# Patient Record
Sex: MALE | Race: WHITE | Employment: OTHER | URBAN - NONMETROPOLITAN AREA
[De-identification: names, ages, dates, MRNs, and addresses within clinical notes are randomized per-mention and may not be internally consistent; named-entity substitution may affect disease eponyms.]

---

## 2019-06-14 ENCOUNTER — OFFICE VISIT (OUTPATIENT)
Dept: PULMONOLOGY | Age: 64
End: 2019-06-14
Payer: COMMERCIAL

## 2019-06-14 VITALS
WEIGHT: 218 LBS | SYSTOLIC BLOOD PRESSURE: 118 MMHG | DIASTOLIC BLOOD PRESSURE: 82 MMHG | OXYGEN SATURATION: 92 % | HEART RATE: 92 BPM | HEIGHT: 63 IN | TEMPERATURE: 97.2 F | BODY MASS INDEX: 38.62 KG/M2

## 2019-06-14 DIAGNOSIS — J44.9 CHRONIC OBSTRUCTIVE PULMONARY DISEASE, UNSPECIFIED COPD TYPE (HCC): ICD-10-CM

## 2019-06-14 DIAGNOSIS — Z87.891 PERSONAL HISTORY OF SMOKING: ICD-10-CM

## 2019-06-14 DIAGNOSIS — E66.9 OBESITY (BMI 30-39.9): ICD-10-CM

## 2019-06-14 DIAGNOSIS — J96.11 CHRONIC RESPIRATORY FAILURE WITH HYPOXIA (HCC): ICD-10-CM

## 2019-06-14 DIAGNOSIS — J98.4 CAVITARY LESION OF LUNG: ICD-10-CM

## 2019-06-14 DIAGNOSIS — G47.10 HYPERSOMNIA: Primary | ICD-10-CM

## 2019-06-14 DIAGNOSIS — R06.89 SLEEP RELATED CHOKING SENSATION: ICD-10-CM

## 2019-06-14 DIAGNOSIS — R06.83 SNORING: ICD-10-CM

## 2019-06-14 PROCEDURE — G8926 SPIRO NO PERF OR DOC: HCPCS | Performed by: INTERNAL MEDICINE

## 2019-06-14 PROCEDURE — 1036F TOBACCO NON-USER: CPT | Performed by: INTERNAL MEDICINE

## 2019-06-14 PROCEDURE — 94618 PULMONARY STRESS TESTING: CPT | Performed by: INTERNAL MEDICINE

## 2019-06-14 PROCEDURE — G8427 DOCREV CUR MEDS BY ELIG CLIN: HCPCS | Performed by: INTERNAL MEDICINE

## 2019-06-14 PROCEDURE — 3023F SPIROM DOC REV: CPT | Performed by: INTERNAL MEDICINE

## 2019-06-14 PROCEDURE — 3017F COLORECTAL CA SCREEN DOC REV: CPT | Performed by: INTERNAL MEDICINE

## 2019-06-14 PROCEDURE — G8417 CALC BMI ABV UP PARAM F/U: HCPCS | Performed by: INTERNAL MEDICINE

## 2019-06-14 PROCEDURE — 99205 OFFICE O/P NEW HI 60 MIN: CPT | Performed by: INTERNAL MEDICINE

## 2019-06-14 RX ORDER — DILTIAZEM HYDROCHLORIDE 180 MG/1
180 CAPSULE, EXTENDED RELEASE ORAL DAILY
COMMUNITY

## 2019-06-14 RX ORDER — LOSARTAN POTASSIUM 25 MG/1
25 TABLET ORAL DAILY
COMMUNITY
End: 2022-09-22

## 2019-06-14 RX ORDER — FUROSEMIDE 20 MG/1
20 TABLET ORAL 2 TIMES DAILY
COMMUNITY

## 2019-06-14 RX ORDER — BUDESONIDE 0.5 MG/2ML
1 INHALANT ORAL 2 TIMES DAILY
COMMUNITY

## 2019-06-14 RX ORDER — ACETAMINOPHEN 80 MG/1
80 TABLET, CHEWABLE ORAL EVERY 4 HOURS PRN
COMMUNITY

## 2019-06-14 ASSESSMENT — ENCOUNTER SYMPTOMS
ABDOMINAL DISTENTION: 1
WHEEZING: 0
ABDOMINAL PAIN: 1
COUGH: 1
BACK PAIN: 0
TROUBLE SWALLOWING: 0
CHOKING: 1
EYES NEGATIVE: 1
VOICE CHANGE: 0
CHEST TIGHTNESS: 1
APNEA: 0
SHORTNESS OF BREATH: 1

## 2019-06-14 NOTE — LETTER
[address] [facility]  [address]  [address] [facility],[address]  [place]  Phone: [phone]  Fax: [phone]    Mark Hampton MD        June 14, 2019     Patient: Kathy Mckenzie   YOB: 1955   Date of Visit: 6/14/2019       To Whom it May Concern:    Jaime Zabala accompanied her  to his appointment on 06/14/2019.          Sincerely,   [facility]

## 2019-06-14 NOTE — PROGRESS NOTES
Subjective:      Patient ID: Earnestine Clemens is a 59 y.o. male. CC: COPD    HPI  Referred by Dr Indy Oviedo for COPD. Has been followed by Dr Courtney Dao in the past--does not wish to see. Smoked 1 1/2 ppd since age 14 to 62 (63 PY) on supplemental oxygen 2 lpm with activities  On Budesonide, Perforomist. Albuterol, Spiriva. UTD in Flu/pneumovax. Retired  for 36 years  Brother has COPD, no family h/o lung cancer  Had CT chest 2017  Last FEV1 70% (2017)  Obese BMI 38, dyspneic even at rest  HTN  Snores, wakes up at night choking, overnight pulse ox reveals severe desats. takes daily naps, since 2014 his wt has gone up from 165 lbs to 218 lbs    Review of Systems   Constitutional: Positive for activity change, fatigue and unexpected weight change. HENT: Negative for trouble swallowing and voice change. Eyes: Negative. Respiratory: Positive for cough, choking, chest tightness and shortness of breath. Negative for apnea and wheezing. Gastrointestinal: Positive for abdominal distention and abdominal pain. Endocrine: Negative. Genitourinary: Negative. Musculoskeletal: Positive for arthralgias. Negative for back pain and gait problem. Allergic/Immunologic: Negative for immunocompromised state. Psychiatric/Behavioral: Positive for sleep disturbance. Obstructive Sleep Apnea Review Of Systems:     Sleep History:    Morning headache:No,   Dryness of mouth in the morning:Yes  Falls asleep in 5  minutes. Any awakenings? Yes  Difficulty Falling back to sleep? No  Symptoms began:  a few years ago. Previous evaluation and treatment? No        Time in Bed:   Bedtime: 10p.m. Awakens  4:30 a.m.      [place] Sleepiness Score: 0-3  Sitting and reading: 3  Watching TV: 2  Sitting inactive in a public place: 0  Being a passenger in a motor vehicle for an hour or more: 3  Lying down in the afternoon: 3  Sitting and talking to someone: 0  Sitting quietly after lunch (no alcohol): 0  Stopped for a few No current facility-administered medications for this visit. LABS - none   There were no vitals taken for this visit. Wt Readings from Last 3 Encounters:   01/24/16 178 lb (80.7 kg)     Neck Circumference - 20.25 in; Mallampati Score - 4        Objective:   Physical Exam   Constitutional: He is oriented to person, place, and time. He appears well-developed. Obese BMI 38, looks dyspneic even at rest   HENT:   Head: Normocephalic and atraumatic. Nose: Nose normal.   Large tongue, crowded pharynx, mallampati class 3 . Eyes: Pupils are equal, round, and reactive to light. EOM are normal. No scleral icterus. Neck: Normal range of motion. Neck supple. No JVD present. No tracheal deviation present. No thyromegaly present. Circumference 20.5 inches   Cardiovascular: Normal rate, regular rhythm, normal heart sounds and intact distal pulses. Exam reveals no gallop and no friction rub. No murmur heard. Pulmonary/Chest: Effort normal. No stridor. He has no wheezes. He has rales. He exhibits no tenderness. Decreased BS, no wheezes, few bibasilar faint rales   Abdominal: Soft. Bowel sounds are normal. He exhibits distension. Large, globular    Musculoskeletal: Normal range of motion. He exhibits no edema, tenderness or deformity. Lymphadenopathy:     He has no cervical adenopathy. Neurological: He is alert and oriented to person, place, and time. No cranial nerve deficit. Coordination normal.   Skin: Skin is warm and dry. Capillary refill takes less than 2 seconds. No rash noted. He is not diaphoretic. Psychiatric: He has a normal mood and affect. His behavior is normal. Judgment and thought content normal.     Six Minute Walk Test  Stuart Watkins 1955    Six minute walk test done in my office today by my medical assistant Nelia Heath.  Pete's oxygen saturation at rest on room air was 88% and was started on supplemental oxygen at 2 lpm     The six minute walk test was repeated with oxygen supplementation. Oxygen supplimentation was started with 2LPM via nasal cannula and was not titrated. At the end of the test Renny Fuentes oxygen saturation remained at 97% on 2 LPM with exertion. He is mobile in the home and requires oxygen as outlined above. During the 6 minute walk Renny Toledo was able to ambulate 806 ft. He was dyspneic with dyspnea score (NORMA) 3    Nasal Oxygen order:  2 lpm to be used with:  Walking Yes   Sleep Yes Continuous Yes                  PFTs:                  CT chest:              Assessment:       Diagnosis Orders   1. Hypersomnia  Baseline Diagnostic Sleep Study    [address] [place] [address]   2. Chronic obstructive pulmonary disease, unspecified COPD type (HCC)  6 Minute Walk Test    Alpha-1-Antitrypsin    Baseline Diagnostic Sleep Study    CT LUNG SCREEN [Initial/Annual]   3. Sleep related choking sensation  Baseline Diagnostic Sleep Study    [address] [place] [address]   4. Chronic respiratory failure with hypoxia (HCC)  Baseline Diagnostic Sleep Study   5. Obesity (BMI 30-39. 9)  Baseline Diagnostic Sleep Study   6. Snoring  Baseline Diagnostic Sleep Study   7. Personal history of smoking  CT LUNG SCREEN [Initial/Annual]   8. Lung nodule < 6cm on CT     9.  Cavitary lesion of lung             Plan:      Orders Placed This Encounter   Procedures    Alpha-1-Antitrypsin     Standing Status:   Future     Standing Expiration Date:   6/14/2020    6 Minute Walk Test     Standing Status:   Future     Standing Expiration Date:   6/14/2020    Baseline Diagnostic Sleep Study     Standing Status:   Future     Standing Expiration Date:   6/14/2020     Order Specific Question:   Adult or Pediatric     Answer:   Adult Study (>7 Years)     Order Specific Question:   Location For Sleep Study     Answer:   [address] Specific Question:   Select Sleep Lab Location     Answer:   [address] is in a good regiment, no need to change, he would benefit from wt reduction and pulmonary rehab.   To be seen at the sleep clinic after PSG is resulted  Follow pulm clinic in 3 mos

## 2019-06-19 ENCOUNTER — HOSPITAL ENCOUNTER (OUTPATIENT)
Dept: GENERAL RADIOLOGY | Age: 64
Discharge: HOME | End: 2019-06-19
Payer: COMMERCIAL

## 2019-06-19 ENCOUNTER — HOSPITAL ENCOUNTER (OUTPATIENT)
Dept: PULMONOLOGY | Age: 64
Discharge: HOME | End: 2019-06-19
Payer: COMMERCIAL

## 2019-06-19 ENCOUNTER — HOSPITAL ENCOUNTER (OUTPATIENT)
Age: 64
Discharge: HOME | End: 2019-06-19
Payer: COMMERCIAL

## 2019-06-19 DIAGNOSIS — J44.9 CHRONIC OBSTRUCTIVE PULMONARY DISEASE, UNSPECIFIED COPD TYPE (HCC): ICD-10-CM

## 2019-06-19 DIAGNOSIS — I50.32 CHRONIC DIASTOLIC HF (HEART FAILURE) (HCC): ICD-10-CM

## 2019-06-19 PROCEDURE — 71046 X-RAY EXAM CHEST 2 VIEWS: CPT

## 2019-06-19 PROCEDURE — 94726 PLETHYSMOGRAPHY LUNG VOLUMES: CPT

## 2019-06-19 PROCEDURE — 94010 BREATHING CAPACITY TEST: CPT

## 2019-06-19 PROCEDURE — 94729 DIFFUSING CAPACITY: CPT

## 2019-06-27 DIAGNOSIS — J44.9 CHRONIC OBSTRUCTIVE PULMONARY DISEASE, UNSPECIFIED COPD TYPE (HCC): ICD-10-CM

## 2019-08-13 ENCOUNTER — HOSPITAL ENCOUNTER (OUTPATIENT)
Dept: SLEEP CENTER | Age: 64
Discharge: HOME | End: 2019-08-15
Payer: COMMERCIAL

## 2019-08-13 DIAGNOSIS — J96.11 CHRONIC RESPIRATORY FAILURE WITH HYPOXIA (HCC): ICD-10-CM

## 2019-08-13 DIAGNOSIS — G47.10 HYPERSOMNIA: ICD-10-CM

## 2019-08-13 DIAGNOSIS — E66.9 OBESITY (BMI 30-39.9): ICD-10-CM

## 2019-08-13 DIAGNOSIS — J44.9 CHRONIC OBSTRUCTIVE PULMONARY DISEASE, UNSPECIFIED COPD TYPE (HCC): ICD-10-CM

## 2019-08-13 DIAGNOSIS — R06.89 SLEEP RELATED CHOKING SENSATION: ICD-10-CM

## 2019-08-13 DIAGNOSIS — R06.83 SNORING: ICD-10-CM

## 2019-08-13 PROCEDURE — 95810 POLYSOM 6/> YRS 4/> PARAM: CPT

## 2019-08-15 LAB — STATUS: NORMAL

## 2019-08-25 DIAGNOSIS — G47.33 OSA (OBSTRUCTIVE SLEEP APNEA): Primary | ICD-10-CM

## 2019-09-03 ENCOUNTER — OFFICE VISIT (OUTPATIENT)
Dept: PULMONOLOGY | Age: 64
End: 2019-09-03
Payer: COMMERCIAL

## 2019-09-03 VITALS
WEIGHT: 222.4 LBS | SYSTOLIC BLOOD PRESSURE: 120 MMHG | TEMPERATURE: 98 F | DIASTOLIC BLOOD PRESSURE: 74 MMHG | OXYGEN SATURATION: 96 % | HEIGHT: 63 IN | HEART RATE: 80 BPM | BODY MASS INDEX: 39.41 KG/M2

## 2019-09-03 DIAGNOSIS — J96.11 CHRONIC RESPIRATORY FAILURE WITH HYPOXIA (HCC): ICD-10-CM

## 2019-09-03 DIAGNOSIS — G47.33 OSA (OBSTRUCTIVE SLEEP APNEA): Primary | ICD-10-CM

## 2019-09-03 DIAGNOSIS — J44.9 CHRONIC OBSTRUCTIVE PULMONARY DISEASE, UNSPECIFIED COPD TYPE (HCC): ICD-10-CM

## 2019-09-03 DIAGNOSIS — I10 BENIGN ESSENTIAL HTN: ICD-10-CM

## 2019-09-03 PROCEDURE — 3017F COLORECTAL CA SCREEN DOC REV: CPT | Performed by: NURSE PRACTITIONER

## 2019-09-03 PROCEDURE — 1036F TOBACCO NON-USER: CPT | Performed by: NURSE PRACTITIONER

## 2019-09-03 PROCEDURE — G8427 DOCREV CUR MEDS BY ELIG CLIN: HCPCS | Performed by: NURSE PRACTITIONER

## 2019-09-03 PROCEDURE — G8926 SPIRO NO PERF OR DOC: HCPCS | Performed by: NURSE PRACTITIONER

## 2019-09-03 PROCEDURE — 3023F SPIROM DOC REV: CPT | Performed by: NURSE PRACTITIONER

## 2019-09-03 PROCEDURE — 99214 OFFICE O/P EST MOD 30 MIN: CPT | Performed by: NURSE PRACTITIONER

## 2019-09-03 PROCEDURE — G8417 CALC BMI ABV UP PARAM F/U: HCPCS | Performed by: NURSE PRACTITIONER

## 2019-09-03 RX ORDER — POTASSIUM CHLORIDE 7.45 MG/ML
10 INJECTION INTRAVENOUS ONCE
COMMUNITY

## 2019-10-04 ENCOUNTER — HOSPITAL ENCOUNTER (OUTPATIENT)
Dept: CT IMAGING | Age: 64
Discharge: HOME | End: 2019-10-04
Payer: COMMERCIAL

## 2019-10-04 DIAGNOSIS — J44.9 CHRONIC OBSTRUCTIVE PULMONARY DISEASE, UNSPECIFIED COPD TYPE (HCC): ICD-10-CM

## 2019-10-04 DIAGNOSIS — R91.1 LUNG NODULE: Primary | ICD-10-CM

## 2019-10-04 DIAGNOSIS — Z87.891 PERSONAL HISTORY OF SMOKING: ICD-10-CM

## 2019-10-04 PROCEDURE — G0297 LDCT FOR LUNG CA SCREEN: HCPCS

## 2019-10-14 ENCOUNTER — HOSPITAL ENCOUNTER (OUTPATIENT)
Dept: SLEEP CENTER | Age: 64
Discharge: HOME | End: 2019-10-16
Payer: COMMERCIAL

## 2019-10-14 DIAGNOSIS — G47.33 OSA (OBSTRUCTIVE SLEEP APNEA): ICD-10-CM

## 2019-10-14 PROCEDURE — 95811 POLYSOM 6/>YRS CPAP 4/> PARM: CPT

## 2019-10-15 LAB — STATUS: NORMAL

## 2019-10-21 DIAGNOSIS — G47.33 OSA (OBSTRUCTIVE SLEEP APNEA): Primary | ICD-10-CM

## 2019-10-22 ENCOUNTER — TELEPHONE (OUTPATIENT)
Dept: SLEEP CENTER | Age: 64
End: 2019-10-22

## 2020-11-21 ENCOUNTER — HOSPITAL ENCOUNTER (OUTPATIENT)
Dept: CT IMAGING | Age: 65
Discharge: HOME | End: 2020-11-21
Payer: COMMERCIAL

## 2020-11-21 PROCEDURE — G0297 LDCT FOR LUNG CA SCREEN: HCPCS

## 2021-10-20 NOTE — PROGRESS NOTES
Spoke with wife - states\" Dr Michoacano Boyd did not clear Liliana Bray for surgery with a general anesthetic due to his COPD\". I informed wife that I would call Donya Quintana at Dr Jericho Serrano office and clarify and that their office would get back to her. She voiced understanding.        NPO after midnight except for sip of water with heart/BP meds  Follow instructions given by surgeon including medications to hold   Bring insurance card and photo ID  Shower morning of surgery with liquid antibacterial soap  Wear loose comfortable clothing  Remove jewelry and do not bring valuables  Bring list of medications with dosages and how often taken if not reviewed with PAT    needed at discharge at lease 26years old  Call PAT at [phone] for questions

## 2021-10-25 NOTE — PROGRESS NOTES
DISCUSSED  HISTORY AND PHYSICAL CLEARANCE RECOMMENDATIONS FROM  [address] [facility] REQUESTING IF PATIENT COULD BE DONE AS A MAC WITH PROPOFOL RATHER THAN A GENERAL ANESTHETIC. DR. Vlad Israel OKAY TO PROCEED AS A MAC. DR. Misti Sexton [facility] NOTIFIED.

## 2021-10-28 ENCOUNTER — ANESTHESIA EVENT (OUTPATIENT)
Dept: OPERATING ROOM | Age: 66
End: 2021-10-28
Payer: COMMERCIAL

## 2021-10-28 ENCOUNTER — HOSPITAL ENCOUNTER (OUTPATIENT)
Age: 66
Setting detail: OUTPATIENT SURGERY
Discharge: HOME | End: 2021-10-28
Attending: OPHTHALMOLOGY | Admitting: OPHTHALMOLOGY
Payer: COMMERCIAL

## 2021-10-28 ENCOUNTER — ANESTHESIA (OUTPATIENT)
Dept: OPERATING ROOM | Age: 66
End: 2021-10-28
Payer: COMMERCIAL

## 2021-10-28 VITALS
HEIGHT: 63 IN | WEIGHT: 210 LBS | HEART RATE: 104 BPM | RESPIRATION RATE: 18 BRPM | OXYGEN SATURATION: 93 % | DIASTOLIC BLOOD PRESSURE: 54 MMHG | SYSTOLIC BLOOD PRESSURE: 108 MMHG | BODY MASS INDEX: 37.21 KG/M2 | TEMPERATURE: 97.4 F

## 2021-10-28 VITALS
DIASTOLIC BLOOD PRESSURE: 60 MMHG | RESPIRATION RATE: 25 BRPM | OXYGEN SATURATION: 95 % | SYSTOLIC BLOOD PRESSURE: 110 MMHG

## 2021-10-28 PROCEDURE — 7100000010 HC PHASE II RECOVERY - FIRST 15 MIN: Performed by: OPHTHALMOLOGY

## 2021-10-28 PROCEDURE — 6370000000 HC RX 637 (ALT 250 FOR IP)

## 2021-10-28 PROCEDURE — 3700000001 HC ADD 15 MINUTES (ANESTHESIA): Performed by: OPHTHALMOLOGY

## 2021-10-28 PROCEDURE — 2709999900 HC NON-CHARGEABLE SUPPLY: Performed by: OPHTHALMOLOGY

## 2021-10-28 PROCEDURE — 2500000003 HC RX 250 WO HCPCS: Performed by: NURSE ANESTHETIST, CERTIFIED REGISTERED

## 2021-10-28 PROCEDURE — 7100000011 HC PHASE II RECOVERY - ADDTL 15 MIN: Performed by: OPHTHALMOLOGY

## 2021-10-28 PROCEDURE — 2580000003 HC RX 258: Performed by: OPHTHALMOLOGY

## 2021-10-28 PROCEDURE — 2500000003 HC RX 250 WO HCPCS: Performed by: OPHTHALMOLOGY

## 2021-10-28 PROCEDURE — 6360000002 HC RX W HCPCS: Performed by: NURSE ANESTHETIST, CERTIFIED REGISTERED

## 2021-10-28 PROCEDURE — 3700000000 HC ANESTHESIA ATTENDED CARE: Performed by: OPHTHALMOLOGY

## 2021-10-28 PROCEDURE — 3600000012 HC SURGERY LEVEL 2 ADDTL 15MIN: Performed by: OPHTHALMOLOGY

## 2021-10-28 PROCEDURE — 3600000002 HC SURGERY LEVEL 2 BASE: Performed by: OPHTHALMOLOGY

## 2021-10-28 RX ORDER — LIDOCAINE HYDROCHLORIDE 20 MG/ML
INJECTION, SOLUTION EPIDURAL; INFILTRATION; INTRACAUDAL; PERINEURAL PRN
Status: DISCONTINUED | OUTPATIENT
Start: 2021-10-28 | End: 2021-10-28 | Stop reason: SDUPTHER

## 2021-10-28 RX ORDER — TROPICAMIDE 10 MG/ML
SOLUTION/ DROPS OPHTHALMIC
Status: COMPLETED
Start: 2021-10-28 | End: 2021-10-28

## 2021-10-28 RX ORDER — TROPICAMIDE 10 MG/ML
1 SOLUTION/ DROPS OPHTHALMIC EVERY 5 MIN PRN
Status: COMPLETED | OUTPATIENT
Start: 2021-10-28 | End: 2021-10-28

## 2021-10-28 RX ORDER — FENTANYL CITRATE 50 UG/ML
INJECTION, SOLUTION INTRAMUSCULAR; INTRAVENOUS PRN
Status: DISCONTINUED | OUTPATIENT
Start: 2021-10-28 | End: 2021-10-28 | Stop reason: SDUPTHER

## 2021-10-28 RX ORDER — PHENYLEPHRINE HCL 2.5 %
DROPS OPHTHALMIC (EYE)
Status: COMPLETED
Start: 2021-10-28 | End: 2021-10-28

## 2021-10-28 RX ORDER — PHENYLEPHRINE HCL 2.5 %
1 DROPS OPHTHALMIC (EYE) EVERY 5 MIN PRN
Status: COMPLETED | OUTPATIENT
Start: 2021-10-28 | End: 2021-10-28

## 2021-10-28 RX ORDER — BUPIVACAINE HYDROCHLORIDE 7.5 MG/ML
1 INJECTION, SOLUTION EPIDURAL; RETROBULBAR EVERY 5 MIN PRN
Status: COMPLETED | OUTPATIENT
Start: 2021-10-28 | End: 2021-10-28

## 2021-10-28 RX ORDER — SODIUM CHLORIDE 9 MG/ML
INJECTION, SOLUTION INTRAVENOUS CONTINUOUS
Status: DISCONTINUED | OUTPATIENT
Start: 2021-10-28 | End: 2021-10-28 | Stop reason: HOSPADM

## 2021-10-28 RX ORDER — OFLOXACIN 3 MG/ML
1 SOLUTION/ DROPS OPHTHALMIC
Status: COMPLETED | OUTPATIENT
Start: 2021-10-28 | End: 2021-10-28

## 2021-10-28 RX ORDER — PROPOFOL 10 MG/ML
INJECTION, EMULSION INTRAVENOUS PRN
Status: DISCONTINUED | OUTPATIENT
Start: 2021-10-28 | End: 2021-10-28 | Stop reason: SDUPTHER

## 2021-10-28 RX ADMIN — LIDOCAINE HYDROCHLORIDE 60 MG: 20 INJECTION, SOLUTION EPIDURAL; INFILTRATION; INTRACAUDAL; PERINEURAL at 12:35

## 2021-10-28 RX ADMIN — TROPICAMIDE 1 DROP: 10 SOLUTION/ DROPS OPHTHALMIC at 11:05

## 2021-10-28 RX ADMIN — SODIUM CHLORIDE: 9 INJECTION, SOLUTION INTRAVENOUS at 12:34

## 2021-10-28 RX ADMIN — TROPICAMIDE 1 DROP: 10 SOLUTION/ DROPS OPHTHALMIC at 11:10

## 2021-10-28 RX ADMIN — BUPIVACAINE HYDROCHLORIDE 0.38 MG: 7.5 INJECTION, SOLUTION EPIDURAL; RETROBULBAR at 11:05

## 2021-10-28 RX ADMIN — TROPICAMIDE 1 DROP: 10 SOLUTION/ DROPS OPHTHALMIC at 11:00

## 2021-10-28 RX ADMIN — TROPICAMIDE 1 DROP: 10 SOLUTION/ DROPS OPHTHALMIC at 11:20

## 2021-10-28 RX ADMIN — TROPICAMIDE 1 DROP: 10 SOLUTION/ DROPS OPHTHALMIC at 11:15

## 2021-10-28 RX ADMIN — PROPOFOL 20 MG: 10 INJECTION, EMULSION INTRAVENOUS at 12:41

## 2021-10-28 RX ADMIN — BUPIVACAINE HYDROCHLORIDE 0.38 MG: 7.5 INJECTION, SOLUTION EPIDURAL; RETROBULBAR at 11:10

## 2021-10-28 RX ADMIN — Medication 1 DROP: at 11:05

## 2021-10-28 RX ADMIN — PROPOFOL 40 MG: 10 INJECTION, EMULSION INTRAVENOUS at 12:35

## 2021-10-28 RX ADMIN — OFLOXACIN 1 DROP: 3 SOLUTION/ DROPS OPHTHALMIC at 11:00

## 2021-10-28 RX ADMIN — BUPIVACAINE HYDROCHLORIDE 0.38 MG: 7.5 INJECTION, SOLUTION EPIDURAL; RETROBULBAR at 11:00

## 2021-10-28 RX ADMIN — Medication 1 DROP: at 11:00

## 2021-10-28 RX ADMIN — Medication 1 DROP: at 11:10

## 2021-10-28 RX ADMIN — FENTANYL CITRATE 50 MCG: 50 INJECTION, SOLUTION INTRAMUSCULAR; INTRAVENOUS at 12:33

## 2021-10-28 RX ADMIN — PHENYLEPHRINE HYDROCHLORIDE 1 DROP: 25 SOLUTION/ DROPS OPHTHALMIC at 11:00

## 2021-10-28 ASSESSMENT — PULMONARY FUNCTION TESTS
PIF_VALUE: 0
PIF_VALUE: 0
PIF_VALUE: 1
PIF_VALUE: 1
PIF_VALUE: 0
PIF_VALUE: 1
PIF_VALUE: 0
PIF_VALUE: 1

## 2021-10-28 ASSESSMENT — PAIN SCALES - GENERAL
PAINLEVEL_OUTOF10: 0

## 2021-10-28 NOTE — PROGRESS NOTES
1249-Patient to Phase II via cart. Report received from Levy Torres and Kenan Steven. Patient unable to have procedure but did receive medications. Dr. Michelle Harvey at bedside to update family and patient. Patient instructed on call light use. 1252-Patient provided with snack and drink. Denies needs. 1315-Discharge instructions reviewed. Verbalized understanding. IV removed with no complications. Patient getting dressed at bedside with assistance from wife. 1325-Patient discharged in stable condition with responsible . All belongings given to patient. Patient ambulated to car with assistance from RN. Patient tolerated well.

## 2021-10-28 NOTE — ANESTHESIA PRE PROCEDURE
Department of Anesthesiology  Preprocedure Note       Name:  Millie Hernandez   Age:  73 y.o.  :  1955                                          MRN:  [id number]         Date:  10/28/2021      Surgeon: Dominic Saba):  Ana Krueger MD    Procedure: Procedure(s):  EYE CATARACT EMULSIFICATION IOL IMPLANT. Case cancelled in OR prior to procedure. Patient unable to tolerate lying flat. Medications prior to admission:   Prior to Admission medications    Medication Sig Start Date End Date Taking?  Authorizing Provider   potassium chloride 10 MEQ/100ML Infuse 10 mEq intravenously once   Yes Historical Provider, MD   aspirin 81 MG tablet Take 81 mg by mouth daily   Yes Historical Provider, MD   acetaminophen (TYLENOL) 80 MG chewable tablet Take 80 mg by mouth every 4 hours as needed for Pain   Yes Historical Provider, MD   furosemide (LASIX) 20 MG tablet Take 20 mg by mouth 2 times daily   Yes Historical Provider, MD   losartan (COZAAR) 25 MG tablet Take 25 mg by mouth daily   Yes Historical Provider, MD   budesonide (PULMICORT) 0.5 MG/2ML nebulizer suspension Take 1 ampule by nebulization 2 times daily   Yes Historical Provider, MD   diltiazem (TAZTIA XT) 180 MG extended release capsule Take 180 mg by mouth daily   Yes Historical Provider, MD   albuterol sulfate HFA (PROAIR HFA) 108 (90 BASE) MCG/ACT inhaler Inhale 2 puffs into the lungs every 4 hours as needed for Wheezing or Shortness of Breath 16  Yes ANUJA Salmeron CNP   Dextromethorphan-Guaifenesin [facility] DM)  MG TB12 Take by mouth 16  Yes ANUJA Salmeron CNP   Umeclidinium Bromide (INCRUSE ELLIPTA) 62.5 MCG/INH AEPB Inhale into the lungs    Historical Provider, MD   albuterol (PROVENTIL) (2.5 MG/3ML) 0.083% nebulizer solution Take 2.5 mg by nebulization every 6 hours as needed for Wheezing    Historical Provider, MD       Current medications:    Current Facility-Administered Medications   Medication Dose Route Frequency Provider Last Rate Last Admin    0.9 % sodium chloride infusion   IntraVENous Continuous Tracy Babb MD   New Bag at 10/28/21 1234       Allergies:  No Known Allergies    Problem List:  There is no problem list on file for this patient. Past Medical History:        Diagnosis Date    Cavitary lesion of lung     COPD (chronic obstructive pulmonary disease) (HCC)     HTN (hypertension)     Pneumonia        Past Surgical History:        Procedure Laterality Date    HERNIA REPAIR      TONSILLECTOMY         Social History:    Social History     Tobacco Use    Smoking status: Former Smoker     Packs/day: 1.50     Years: 45.00     Pack years: 67.50     Types: Cigarettes     Quit date: 2015     Years since quittin.3    Smokeless tobacco: Never Used   Substance Use Topics    Alcohol use: Yes                                Counseling given: Not Answered      Vital Signs (Current):   Vitals:    10/20/21 1519 10/28/21 1100 10/28/21 1249   BP:  120/79 (!) 108/54   Pulse:  106 104   Resp:  20 18   Temp:  97.8 °F (36.6 °C) 97.4 °F (36.3 °C)   TempSrc:  Skin Temporal   SpO2:  95% 93%   Weight: 210 lb (95.3 kg)     Height: 5' 3\" (1.6 m)                                                BP Readings from Last 3 Encounters:   10/28/21 (!) 108/54   10/28/21 110/60   19 120/74       NPO Status: Time of last liquid consumption: 630 (sip with meds)                        Time of last solid consumption:                         Date of last liquid consumption: 10/28/21                        Date of last solid food consumption: 10/28/21    BMI:   Wt Readings from Last 3 Encounters:   10/20/21 210 lb (95.3 kg)   19 222 lb 6.4 oz (100.9 kg)   19 218 lb (98.9 kg)     Body mass index is 37.2 kg/m².     CBC: No results found for: WBC, RBC, HGB, HCT, MCV, RDW, PLT    CMP: No results found for: NA, K, CL, CO2, BUN, CREATININE, GFRAA, AGRATIO, LABGLOM, GLUCOSE, PROT, CALCIUM, BILITOT, ALKPHOS, AST, ALT    POC Tests: No results for input(s): POCGLU, POCNA, POCK, POCCL, POCBUN, POCHEMO, POCHCT in the last 72 hours. Coags: No results found for: PROTIME, INR, APTT    HCG (If Applicable): No results found for: PREGTESTUR, PREGSERUM, HCG, HCGQUANT     ABGs: No results found for: PHART, PO2ART, QHT8CQL, KBU5XEY, BEART, L2DQIONJ     Type & Screen (If Applicable):  No results found for: LABABO, LABRH    Drug/Infectious Status (If Applicable):  No results found for: HIV, HEPCAB    COVID-19 Screening (If Applicable): No results found for: COVID19        Anesthesia Evaluation    Airway: Mallampati: III  TM distance: >3 FB   Neck ROM: full  Mouth opening: > = 3 FB Dental:          Pulmonary:   (+) COPD:  decreased breath sounds,                             Cardiovascular:    (+) hypertension:,                   Neuro/Psych:               GI/Hepatic/Renal:   (+) morbid obesity          Endo/Other:                     Abdominal:   (+) obese,           Vascular: Other Findings:             Anesthesia Plan      MAC     ASA 4     (Severe COPD  HIS PRIMARY CARE DOCTOR DID NOT CLEAR HIM FOR GA)  Induction: intravenous. Anesthetic plan and risks discussed with patient and spouse. Plan discussed with CRNA.                   Mariana Hampton MD   10/28/2021

## 2022-09-22 ENCOUNTER — INITIAL CONSULT (OUTPATIENT)
Dept: NEUROLOGY | Age: 67
End: 2022-09-22
Payer: COMMERCIAL

## 2022-09-22 VITALS
HEIGHT: 63 IN | BODY MASS INDEX: 31.54 KG/M2 | DIASTOLIC BLOOD PRESSURE: 84 MMHG | WEIGHT: 178 LBS | HEART RATE: 97 BPM | SYSTOLIC BLOOD PRESSURE: 134 MMHG | OXYGEN SATURATION: 96 %

## 2022-09-22 DIAGNOSIS — G20 PARKINSON'S DISEASE (HCC): Primary | ICD-10-CM

## 2022-09-22 DIAGNOSIS — R25.8 BRADYKINESIA: ICD-10-CM

## 2022-09-22 DIAGNOSIS — R49.8 HYPOPHONIA: ICD-10-CM

## 2022-09-22 DIAGNOSIS — R26.89 SHUFFLING GAIT: ICD-10-CM

## 2022-09-22 PROCEDURE — 1036F TOBACCO NON-USER: CPT | Performed by: PSYCHIATRY & NEUROLOGY

## 2022-09-22 PROCEDURE — 3017F COLORECTAL CA SCREEN DOC REV: CPT | Performed by: PSYCHIATRY & NEUROLOGY

## 2022-09-22 PROCEDURE — G8417 CALC BMI ABV UP PARAM F/U: HCPCS | Performed by: PSYCHIATRY & NEUROLOGY

## 2022-09-22 PROCEDURE — 99205 OFFICE O/P NEW HI 60 MIN: CPT | Performed by: PSYCHIATRY & NEUROLOGY

## 2022-09-22 PROCEDURE — 1123F ACP DISCUSS/DSCN MKR DOCD: CPT | Performed by: PSYCHIATRY & NEUROLOGY

## 2022-09-22 PROCEDURE — G8427 DOCREV CUR MEDS BY ELIG CLIN: HCPCS | Performed by: PSYCHIATRY & NEUROLOGY

## 2022-09-22 RX ORDER — MIDODRINE HYDROCHLORIDE 5 MG/1
TABLET ORAL
COMMUNITY
Start: 2022-09-14

## 2022-09-22 RX ORDER — ROFLUMILAST 500 UG/1
TABLET ORAL
COMMUNITY
Start: 2022-09-19

## 2022-09-22 RX ORDER — FORMOTEROL FUMARATE 20 UG/2ML
SOLUTION RESPIRATORY (INHALATION)
COMMUNITY
Start: 2022-09-22

## 2022-09-22 RX ORDER — CYANOCOBALAMIN 1000 UG/ML
INJECTION INTRAMUSCULAR; SUBCUTANEOUS
COMMUNITY
Start: 2022-08-29

## 2022-09-22 NOTE — LETTER
[address] [facility]  [facility] [address] [place] [id number]  Phone: [phone]  Fax: [phone]    Forrest Loza MD    September 28, 2022     Warnell Eisenmenger, MD [address] [place] [id number]    Patient: Fanta Padilla   MR Number: [id number]   YOB: 1955   Date of Visit: 9/22/2022       Dear Warnell Eisenmenger: Thank you for referring Edwin Manning to me for evaluation/treatment. Below are the relevant portions of my assessment and plan of care. If you have questions, please do not hesitate to call me. I look forward to following Ariadna along with you.     Sincerely,      Forrest Loza MD

## 2022-09-22 NOTE — PATIENT INSTRUCTIONS
Change Sinemet to 1.5 tablets three times a day, Take at 5am, 10am, 3pm daily  Referral to physical therapy for gait safety  Referral to speech therapy for mouth floor exercises  Vitamin B12, folate  Vitamin B6 level  No driving for your safety and the safety of others  You need to stay physically active  Call with any new symptoms or concerns.    Follow up in 2 weeks

## 2022-09-27 DIAGNOSIS — G20 PARKINSON'S DISEASE (HCC): Primary | ICD-10-CM

## 2022-09-27 RX ORDER — CARBIDOPA AND LEVODOPA 25; 100 MG/1; MG/1
1 TABLET, EXTENDED RELEASE ORAL NIGHTLY
Qty: 30 TABLET | Refills: 2 | Status: SHIPPED | OUTPATIENT
Start: 2022-09-27 | End: 2022-10-11 | Stop reason: SINTOL

## 2022-09-27 NOTE — TELEPHONE ENCOUNTER
Randolph Goldman, on HIPAA, called stating patient is taking Sinemet 25/100 mg 1.5 tablets three times a day, at 5 am, 10 am, and 3 pm since last visit. For the past 2 days, patient is waking up during the night and is not able to get up on his own. He was previoiusly able to get up on  his own during the night. Patient started physical therapy today. Spoke with Dr Edson Muñiz who stated to try Sinemet CR 25/100 mg nightly. Wife notified and verbalized understanding.

## 2022-09-28 NOTE — PROGRESS NOTES
Chief Complaint   Patient presents with    Consultation     [facility] is a 78 y.o. male who presents today for evaluation of shuffling gait for the past 6 months that has progressively gotten worse. His wife reports he is slower with his movements. He has not fallen. His voice pitch has gotten softer. He denies any dysphagia. His wife reports he has hypersalivation. His hand writing is smaller. He has trouble getting out of chairs and low lying couches. He is more hunched over. He was started on sinemet 25/100 mg three times a day and feels that it has helped some, but there is more room for improvement. He denies chest pain. No shortness of breath, no neck pain. No vision changes. No dysphagia. No fever. No rash. No weight loss. History provided by patient accompanied by his wife. Past Medical History:   Diagnosis Date    Cavitary lesion of lung     COPD (chronic obstructive pulmonary disease) (HCC)     HTN (hypertension)     Pneumonia        There is no problem list on file for this patient.       No Known Allergies    Current Outpatient Medications   Medication Sig Dispense Refill    carbidopa-levodopa (SINEMET)  MG per tablet TAKE 1 TABLET BY MOUTH EVERY 8 HOURS FOR 30 DAYS      cyanocobalamin 1000 MCG/ML injection 1 ML INJECTION ONCE A MONTH      formoterol (PERFOROMIST) 20 MCG/2ML nebulizer solution       midodrine (PROAMATINE) 5 MG tablet TAKE 1 TABLET BY MOUTH 3 TIMES A DAY      DALIRESP 500 MCG tablet TAKE 1 TABLET BY MOUTH EVERY DAY FOR 90 DAYS      Multiple Vitamins-Minerals (OCUVITE PO) Take by mouth      potassium chloride 10 MEQ/100ML Infuse 10 mEq intravenously once      aspirin 81 MG tablet Take 81 mg by mouth daily      acetaminophen (TYLENOL) 80 MG chewable tablet Take 80 mg by mouth every 4 hours as needed for Pain      furosemide (LASIX) 20 MG tablet Take 20 mg by mouth 2 times daily      budesonide (PULMICORT) 0.5 MG/2ML nebulizer suspension Take 1 ampule by nebulization 2 times daily      dilTIAZem (TIAZAC) 180 MG extended release capsule Take 180 mg by mouth daily      albuterol (PROVENTIL) (2.5 MG/3ML) 0.083% nebulizer solution Take 2.5 mg by nebulization every 6 hours as needed for Wheezing      albuterol sulfate HFA (PROAIR HFA) 108 (90 BASE) MCG/ACT inhaler Inhale 2 puffs into the lungs every 4 hours as needed for Wheezing or Shortness of Breath 1 Inhaler 3    Dextromethorphan-Guaifenesin (MUCINEX DM)  MG TB12 Take by mouth 28 tablet 0     No current facility-administered medications for this visit. Social History     Socioeconomic History    Marital status:      Spouse name: None    Number of children: None    Years of education: None    Highest education level: None   Tobacco Use    Smoking status: Former     Packs/day: 1.50     Years: 45.00     Pack years: 67.50     Types: Cigarettes     Quit date: 2015     Years since quittin.2    Smokeless tobacco: Never   Vaping Use    Vaping Use: Never used   Substance and Sexual Activity    Alcohol use: Yes    Drug use: Not Currently    Sexual activity: Yes     Partners: Female       Family History   Problem Relation Age of Onset    Heart Disease Mother     Diabetes Mother     Hypertension Mother     Alzheimer's Disease Father     No Known Problems Sister     No Known Problems Sister     No Known Problems Sister     Emphysema Brother     COPD Brother     No Known Problems Brother          I reviewed the past medical history, allergies, medications, social history and family history.    Review of Systems   All systems reviewed, and are all negative, except what is mentioned in HPI      Vitals:    22 1519   BP: 134/84   Site: Left Upper Arm   Position: Sitting   Cuff Size: Medium Adult   Pulse: 97   SpO2: 96%   Weight: 178 lb (80.7 kg)   Height: 5' 3\" (1.6 m)       Physical Examination:  General appearance - alert, well appearing, and in no distress, oriented to person, place, and time and intact bowel sounds. We reviewed the patient records from referring provider and available information in the EHR       ASSESSMENT:      Diagnosis Orders   1. Bradykinesia        2. Hypophonia        3. Shuffling gait           This is a 76-year-old male who presents with shuffling gait for the past 6 months that is progressively gotten worse. His wife reports he is slower with his movements. His voice pitch has gotten softer. He was started on Sinemet 25/100 mg 3 times a day by his PCP and feels it helps some, however there is more room for improvement. I reviewed the patient pertinent labs and records in the EHR and from other providers. On exam, there is bradykinesia, reduced blink rate, hypophonia. The patient was counseled about his symptoms and work up recommended, he was also counseled about medication options and side effects. We shared with the patient and his wife his symptoms are consistent with Parkinson's disease. We will increase his Sinemet to 1-1/2 tablets 3 times a day and arrange for him to undergo formal evaluations with both physical and speech therapy for gait safety and mouth floor exercises, respectively. We asked him to hold off on driving for his safety and the safety of others until cleared. After detailed discussion with the patient and his wife we agreed on the following plan. Plan    Change Sinemet to 1.5 tablets three times a day, Take at 5am, 10am, 3pm daily  Referral to physical therapy for gait safety  Referral to speech therapy for mouth floor exercises  Vitamin B12, folate  Vitamin B6 level  No driving for your safety and the safety of others  You need to stay physically active  Call with any new symptoms or concerns.    Follow up in 2 weeks    Total time 67 min    Kelsie Salinas MD

## 2022-10-06 ENCOUNTER — TELEPHONE (OUTPATIENT)
Dept: NEUROLOGY | Age: 67
End: 2022-10-06

## 2022-10-06 NOTE — TELEPHONE ENCOUNTER
Wife, Erin Vargas, called stating patient tried Sinemet CR last week but was stopped by PCP due to tachycardia. Per wife, PCP had spoken with Dr Kirby Davila and who stated to stop the Sinemet CR and go back on previous dose of regular Sinemet 1 tablet three times a day, every 8 hours. She is updating office.

## 2022-10-11 DIAGNOSIS — G20 PARKINSON'S DISEASE (HCC): Primary | ICD-10-CM

## 2022-10-11 NOTE — TELEPHONE ENCOUNTER
Wife requesting refill be sent to pharmacy for previous dose Sinemet 25/100 mg 1 tablet three times a day.

## 2022-10-24 ENCOUNTER — HOSPITAL ENCOUNTER (EMERGENCY)
Age: 67
Discharge: HOME | End: 2022-10-25
Attending: EMERGENCY MEDICINE
Payer: COMMERCIAL

## 2022-10-24 ENCOUNTER — APPOINTMENT (OUTPATIENT)
Dept: GENERAL RADIOLOGY | Age: 67
End: 2022-10-24
Payer: COMMERCIAL

## 2022-10-24 DIAGNOSIS — U07.1 COVID: Primary | ICD-10-CM

## 2022-10-24 LAB
ANION GAP SERPL CALCULATED.3IONS-SCNC: 13 MEQ/L (ref 8–16)
BASOPHILS # BLD: 0.4 %
BASOPHILS ABSOLUTE: 0.1 THOU/MM3 (ref 0–0.1)
BUN BLDV-MCNC: 16 MG/DL (ref 7–22)
CALCIUM SERPL-MCNC: 9.5 MG/DL (ref 8.5–10.5)
CHLORIDE BLD-SCNC: 107 MEQ/L (ref 98–111)
CO2: 25 MEQ/L (ref 23–33)
CREAT SERPL-MCNC: 1 MG/DL (ref 0.4–1.2)
EOSINOPHIL # BLD: 0.8 %
EOSINOPHILS ABSOLUTE: 0.1 THOU/MM3 (ref 0–0.4)
ERYTHROCYTE [DISTWIDTH] IN BLOOD BY AUTOMATED COUNT: 17.1 % (ref 11.5–14.5)
ERYTHROCYTE [DISTWIDTH] IN BLOOD BY AUTOMATED COUNT: 53.4 FL (ref 35–45)
GFR SERPL CREATININE-BSD FRML MDRD: > 60 ML/MIN/1.73M2
GLUCOSE BLD-MCNC: 120 MG/DL (ref 70–108)
HCT VFR BLD CALC: 43.6 % (ref 42–52)
HEMOGLOBIN: 13.7 GM/DL (ref 14–18)
IMMATURE GRANS (ABS): 0.08 THOU/MM3 (ref 0–0.07)
IMMATURE GRANULOCYTES: 0.5 %
INFLUENZA A: NOT DETECTED
INFLUENZA B: NOT DETECTED
LYMPHOCYTES # BLD: 4.8 %
LYMPHOCYTES ABSOLUTE: 0.7 THOU/MM3 (ref 1–4.8)
MCH RBC QN AUTO: 27.5 PG (ref 26–33)
MCHC RBC AUTO-ENTMCNC: 31.4 GM/DL (ref 32.2–35.5)
MCV RBC AUTO: 87.4 FL (ref 80–94)
MONOCYTES # BLD: 5.4 %
MONOCYTES ABSOLUTE: 0.8 THOU/MM3 (ref 0.4–1.3)
NUCLEATED RED BLOOD CELLS: 0 /100 WBC
OSMOLALITY CALCULATION: 291.1 MOSMOL/KG (ref 275–300)
PLATELET # BLD: 198 THOU/MM3 (ref 130–400)
PMV BLD AUTO: 8.7 FL (ref 9.4–12.4)
POTASSIUM REFLEX MAGNESIUM: 4.2 MEQ/L (ref 3.5–5.2)
PRO-BNP: 95.1 PG/ML (ref 0–900)
RBC # BLD: 4.99 MILL/MM3 (ref 4.7–6.1)
SARS-COV-2 RNA, RT PCR: DETECTED
SEG NEUTROPHILS: 88.1 %
SEGMENTED NEUTROPHILS ABSOLUTE COUNT: 13.3 THOU/MM3 (ref 1.8–7.7)
SODIUM BLD-SCNC: 145 MEQ/L (ref 135–145)
TROPONIN T: < 0.01 NG/ML
WBC # BLD: 15.1 THOU/MM3 (ref 4.8–10.8)

## 2022-10-24 PROCEDURE — 85025 COMPLETE CBC W/AUTO DIFF WBC: CPT

## 2022-10-24 PROCEDURE — 71045 X-RAY EXAM CHEST 1 VIEW: CPT

## 2022-10-24 PROCEDURE — 36600 WITHDRAWAL OF ARTERIAL BLOOD: CPT

## 2022-10-24 PROCEDURE — 36415 COLL VENOUS BLD VENIPUNCTURE: CPT

## 2022-10-24 PROCEDURE — 2700000000 HC OXYGEN THERAPY PER DAY

## 2022-10-24 PROCEDURE — 84484 ASSAY OF TROPONIN QUANT: CPT

## 2022-10-24 PROCEDURE — 83880 ASSAY OF NATRIURETIC PEPTIDE: CPT

## 2022-10-24 PROCEDURE — 94761 N-INVAS EAR/PLS OXIMETRY MLT: CPT

## 2022-10-24 PROCEDURE — 96360 HYDRATION IV INFUSION INIT: CPT

## 2022-10-24 PROCEDURE — 2580000003 HC RX 258: Performed by: STUDENT IN AN ORGANIZED HEALTH CARE EDUCATION/TRAINING PROGRAM

## 2022-10-24 PROCEDURE — 87636 SARSCOV2 & INF A&B AMP PRB: CPT

## 2022-10-24 PROCEDURE — 80048 BASIC METABOLIC PNL TOTAL CA: CPT

## 2022-10-24 PROCEDURE — 99285 EMERGENCY DEPT VISIT HI MDM: CPT | Performed by: EMERGENCY MEDICINE

## 2022-10-24 PROCEDURE — 93005 ELECTROCARDIOGRAM TRACING: CPT | Performed by: EMERGENCY MEDICINE

## 2022-10-24 RX ORDER — 0.9 % SODIUM CHLORIDE 0.9 %
1000 INTRAVENOUS SOLUTION INTRAVENOUS ONCE
Status: COMPLETED | OUTPATIENT
Start: 2022-10-24 | End: 2022-10-24

## 2022-10-24 RX ADMIN — SODIUM CHLORIDE 1000 ML: 9 INJECTION, SOLUTION INTRAVENOUS at 22:26

## 2022-10-24 ASSESSMENT — PAIN - FUNCTIONAL ASSESSMENT: PAIN_FUNCTIONAL_ASSESSMENT: NONE - DENIES PAIN

## 2022-10-25 VITALS
BODY MASS INDEX: 30.83 KG/M2 | SYSTOLIC BLOOD PRESSURE: 130 MMHG | HEART RATE: 102 BPM | TEMPERATURE: 99.3 F | HEIGHT: 63 IN | DIASTOLIC BLOOD PRESSURE: 82 MMHG | WEIGHT: 174 LBS | OXYGEN SATURATION: 95 % | RESPIRATION RATE: 24 BRPM

## 2022-10-25 LAB
ALLEN TEST: POSITIVE
AMMONIA: 31 UMOL/L (ref 11–60)
BASE EXCESS (CALCULATED): 0.4 MMOL/L (ref -2.5–2.5)
BILIRUBIN URINE: NEGATIVE
BLOOD, URINE: NEGATIVE
CHARACTER, URINE: CLEAR
COLLECTED BY:: NORMAL
COLOR: YELLOW
DEVICE: NORMAL
EKG ATRIAL RATE: 260 BPM
EKG P AXIS: -108 DEGREES
EKG Q-T INTERVAL: 272 MS
EKG QRS DURATION: 106 MS
EKG QTC CALCULATION (BAZETT): 400 MS
EKG R AXIS: -73 DEGREES
EKG T AXIS: 81 DEGREES
EKG VENTRICULAR RATE: 130 BPM
GLUCOSE URINE: NEGATIVE MG/DL
HCO3: 25 MMOL/L (ref 23–28)
IFIO2: 2
KETONES, URINE: NEGATIVE
LEUKOCYTE ESTERASE, URINE: NEGATIVE
NITRITE, URINE: NEGATIVE
O2 SATURATION: 96 %
PCO2: 37 MMHG (ref 35–45)
PH BLOOD GAS: 7.43 (ref 7.35–7.45)
PH UA: 7 (ref 5–9)
PO2: 80 MMHG (ref 71–104)
PROTEIN UA: NEGATIVE
SOURCE, BLOOD GAS: NORMAL
SPECIFIC GRAVITY, URINE: 1.02 (ref 1–1.03)
UROBILINOGEN, URINE: 1 EU/DL (ref 0–1)

## 2022-10-25 PROCEDURE — 82803 BLOOD GASES ANY COMBINATION: CPT

## 2022-10-25 PROCEDURE — 82140 ASSAY OF AMMONIA: CPT

## 2022-10-25 PROCEDURE — 93010 ELECTROCARDIOGRAM REPORT: CPT | Performed by: INTERNAL MEDICINE

## 2022-10-25 PROCEDURE — 6370000000 HC RX 637 (ALT 250 FOR IP): Performed by: STUDENT IN AN ORGANIZED HEALTH CARE EDUCATION/TRAINING PROGRAM

## 2022-10-25 PROCEDURE — 36415 COLL VENOUS BLD VENIPUNCTURE: CPT

## 2022-10-25 PROCEDURE — 81003 URINALYSIS AUTO W/O SCOPE: CPT

## 2022-10-25 RX ORDER — ACETAMINOPHEN 325 MG/1
650 TABLET ORAL ONCE
Status: COMPLETED | OUTPATIENT
Start: 2022-10-25 | End: 2022-10-25

## 2022-10-25 RX ADMIN — ACETAMINOPHEN 650 MG: 325 TABLET ORAL at 01:42

## 2022-10-25 ASSESSMENT — ENCOUNTER SYMPTOMS
COUGH: 1
ABDOMINAL PAIN: 0
DIARRHEA: 0
PHOTOPHOBIA: 0
SHORTNESS OF BREATH: 1
CONSTIPATION: 0
NAUSEA: 0
ABDOMINAL DISTENTION: 0
VOMITING: 0
CHEST TIGHTNESS: 0
WHEEZING: 0
SORE THROAT: 1

## 2022-10-25 NOTE — ED NOTES
Pt medicated per MAR. Resting in bed with wife at bedside. Call light within reach.      Paul Banks RN  10/25/22 1896

## 2022-10-25 NOTE — ED PROVIDER NOTES
[facility] ENCOUNTER          Pt Name: Kourtney De La O  MRN: [id number]  [place] 1955  Date of evaluation: 10/24/2022  Treating Resident Physician: Jake Wall MD  Supervising Physician: Christel Dejesus COMPLAINT       Chief Complaint   Patient presents with    Shortness of Breath    Fever     History obtained from the patient. HISTORY OF PRESENT ILLNESS    HPI  Kourtney De La O is a 78 y.o. male who presents to the emergency department for evaluation of fever, malaise, shortness of breath. Patient has history of Parkinson's, COPD, has been taking his medications as prescribed, states that today, he began having a fever, has myalgias, arthralgias, increased shortness of breath. Patient has not had any increased requirements and his oxygen baseline of 2 to 5 L at home. Patient denies any chest pain or leg swelling, leg tenderness. Patient typically ambulates by himself. Has been requiring a bit of assistance to get to the bathroom lately. The patient has no other acute complaints at this time. REVIEW OF SYSTEMS   Review of Systems   Constitutional:  Positive for chills, diaphoresis, fatigue and fever. HENT:  Positive for congestion and sore throat. Eyes:  Negative for photophobia and visual disturbance. Respiratory:  Positive for cough and shortness of breath. Negative for chest tightness and wheezing. Cardiovascular:  Negative for chest pain, palpitations and leg swelling. Gastrointestinal:  Negative for abdominal distention, abdominal pain, constipation, diarrhea, nausea and vomiting. Genitourinary: Negative. Musculoskeletal:  Positive for arthralgias and myalgias. Negative for gait problem, neck pain and neck stiffness. Skin:  Negative for rash and wound. Neurological:  Negative for numbness and headaches. Hematological:  Negative for adenopathy. Does not bruise/bleed easily.         PAST MEDICAL AND SURGICAL HISTORY     Past Medical History:   Diagnosis Date    Cavitary lesion of lung     COPD (chronic obstructive pulmonary disease) (HCC)     HTN (hypertension)     Pneumonia      Past Surgical History:   Procedure Laterality Date    HERNIA REPAIR      TONSILLECTOMY           MEDICATIONS   No current facility-administered medications for this encounter.     Current Outpatient Medications:     carbidopa-levodopa (SINEMET)  MG per tablet, Take 1 tablet by mouth 3 times daily, Disp: 90 tablet, Rfl: 3    cyanocobalamin 1000 MCG/ML injection, 1 ML INJECTION ONCE A MONTH, Disp: , Rfl:     formoterol (PERFOROMIST) 20 MCG/2ML nebulizer solution, , Disp: , Rfl:     midodrine (PROAMATINE) 5 MG tablet, TAKE 1 TABLET BY MOUTH 3 TIMES A DAY, Disp: , Rfl:     DALIRESP 500 MCG tablet, TAKE 1 TABLET BY MOUTH EVERY DAY FOR 90 DAYS, Disp: , Rfl:     Multiple Vitamins-Minerals (OCUVITE PO), Take by mouth, Disp: , Rfl:     potassium chloride 10 MEQ/100ML, Infuse 10 mEq intravenously once, Disp: , Rfl:     aspirin 81 MG tablet, Take 81 mg by mouth daily, Disp: , Rfl:     acetaminophen (TYLENOL) 80 MG chewable tablet, Take 80 mg by mouth every 4 hours as needed for Pain, Disp: , Rfl:     furosemide (LASIX) 20 MG tablet, Take 20 mg by mouth 2 times daily, Disp: , Rfl:     budesonide (PULMICORT) 0.5 MG/2ML nebulizer suspension, Take 1 ampule by nebulization 2 times daily, Disp: , Rfl:     dilTIAZem (TIAZAC) 180 MG extended release capsule, Take 180 mg by mouth daily, Disp: , Rfl:     albuterol (PROVENTIL) (2.5 MG/3ML) 0.083% nebulizer solution, Take 2.5 mg by nebulization every 6 hours as needed for Wheezing, Disp: , Rfl:     albuterol sulfate HFA (PROAIR HFA) 108 (90 BASE) MCG/ACT inhaler, Inhale 2 puffs into the lungs every 4 hours as needed for Wheezing or Shortness of Breath, Disp: 1 Inhaler, Rfl: 3    Dextromethorphan-Guaifenesin (MUCINEX DM)  MG TB12, Take by mouth, Disp: 28 tablet, Rfl: 0      SOCIAL HISTORY     Social History Social History Narrative    Not on file     Social History     Tobacco Use    Smoking status: Former     Packs/day: 1.50     Years: 45.00     Pack years: 67.50     Types: Cigarettes     Quit date: 2015     Years since quittin.3    Smokeless tobacco: Never   Vaping Use    Vaping Use: Never used   Substance Use Topics    Alcohol use: Yes    Drug use: Not Currently         ALLERGIES   No Known Allergies      FAMILY HISTORY     Family History   Problem Relation Age of Onset    Heart Disease Mother     Diabetes Mother     Hypertension Mother     Alzheimer's Disease Father     No Known Problems Sister     No Known Problems Sister     No Known Problems Sister     Emphysema Brother     COPD Brother     No Known Problems Brother          PREVIOUS RECORDS   Previous records reviewed: Medical, past surgical, medications, allergies        PHYSICAL EXAM     ED Triage Vitals [10/24/22 2145]   BP Temp Temp Source Heart Rate Resp SpO2 Height Weight   (!) 167/81 (!) 100.5 °F (38.1 °C) Oral (!) 134 27 97 % 5' 3\" (1.6 m) 174 lb (78.9 kg)     Initial vital signs and nursing assessment reviewed and  tachycardic, tachypneic . Body mass index is 30.82 kg/m². Pulsoximetry is normal per my interpretation. Additional Vital Signs:  Vitals:    10/25/22 0132   BP:    Pulse: (!) 102   Resp: 24   Temp:    SpO2:        Physical Exam  Constitutional:       General: He is not in acute distress. Appearance: He is well-developed. He is ill-appearing. He is not toxic-appearing or diaphoretic. Interventions: He is not intubated. HENT:      Head: Normocephalic and atraumatic. Mouth/Throat:      Mouth: Mucous membranes are moist.      Pharynx: Oropharynx is clear. Eyes:      Pupils: Pupils are equal, round, and reactive to light. Cardiovascular:      Rate and Rhythm: Regular rhythm. Tachycardia present. Pulmonary:      Effort: Tachypnea present. No bradypnea, accessory muscle usage or respiratory distress.  He is not intubated. Breath sounds: No stridor. No decreased breath sounds, wheezing, rhonchi or rales. Comments: Transmitted upper airway sounds from secretions. Clears after cough  Musculoskeletal:      Cervical back: Normal range of motion and neck supple. Right lower leg: No tenderness. No edema. Left lower leg: No tenderness. No edema. Skin:     General: Skin is warm and dry. Capillary Refill: Capillary refill takes less than 2 seconds. Neurological:      General: No focal deficit present. Mental Status: He is alert and oriented to person, place, and time. MEDICAL DECISION MAKING   Initial Assessment: This is a 29-year-old male, history of Parkinson's, COPD, presenting with shortness of breath. Physical exam significant for mildly ill-appearing male in no acute distress, tachycardic, mildly tachypneic with exertion, upper airway transmitted noises, lungs clear to auscultation otherwise. Differential diagnoses include not limited to COVID, flu, sepsis, pneumonia. Plan:   CBC, BMP, troponin, EKG, chest x-ray  Labs significant for COVID-positive  Chest x-ray shows no acute findings  Patient given Tylenol and fluids with improvement in condition. Upon reevaluation, patient seems sleepier than he had previously. ABG shows no hypercapnia or hypoxia  UA and ammonia also within normal limits. No additional concerns for altered mentation at this time. Patient at mental baseline per wife. Patient wanting to go home. I ambulated patient around the room with minimal assistance. No desaturation with ambulation. Discussed with him that he was still tachycardic, discussed that I was concerned that he would likely progress in his illness. Patient understands, would still like to go home. Wife amenable to this at this time.   Discharged home with strict return precautions, follow-up        ED RESULTS   Laboratory results:  Labs Reviewed   COVID-19 & INFLUENZA COMBO - Abnormal; Notable for the following components:       Result Value    SARS-CoV-2 RNA, RT PCR DETECTED (*)     All other components within normal limits   BASIC METABOLIC PANEL W/ REFLEX TO MG FOR LOW K - Abnormal; Notable for the following components:    Glucose 120 (*)     All other components within normal limits   CBC WITH AUTO DIFFERENTIAL - Abnormal; Notable for the following components:    WBC 15.1 (*)     Hemoglobin 13.7 (*)     MCHC 31.4 (*)     RDW-CV 17.1 (*)     RDW-SD 53.4 (*)     MPV 8.7 (*)     Segs Absolute 13.3 (*)     Lymphocytes Absolute 0.7 (*)     Immature Grans (Abs) 0.08 (*)     All other components within normal limits   BRAIN NATRIURETIC PEPTIDE   TROPONIN   GLOMERULAR FILTRATION RATE, ESTIMATED   ANION GAP   OSMOLALITY   BLOOD GAS, ARTERIAL   AMMONIA   URINALYSIS WITH REFLEX TO CULTURE       Radiologic studies results:  XR CHEST PORTABLE   Final Result   Impression:   1. No acute infiltrate. This document has been electronically signed by: Yasir Deshpande MD on    10/24/2022 10:48 PM          ED Medications administered this visit:   Medications   0.9 % sodium chloride bolus (0 mLs IntraVENous Stopped 10/24/22 2330)   acetaminophen (TYLENOL) tablet 650 mg (650 mg Oral Given 10/25/22 0142)         ED COURSE         Strict return precautions and follow up instructions were discussed with the patient prior to discharge, with which the patient agrees. MEDICATION CHANGES     Discharge Medication List as of 10/25/2022  1:50 AM            FINAL DISPOSITION     Final diagnoses:   COVID     Condition: condition: good  Dispo: Discharge to home      This transcription was electronically signed. Parts of this transcriptions may have been dictated by use of voice recognition software and electronically transcribed, and parts may have been transcribed with the assistance of an ED scribe. The transcription may contain errors not detected in proofreading.   Please refer to my supervising physician's

## 2022-10-25 NOTE — ED NOTES
Discharge instructions and follow up discussed with pt. Pt verbalized understanding and denied further questions. LDA removed. Pt discharged with all belongings.         Lilliam Wiggins RN  10/25/22 4269

## 2022-10-25 NOTE — ED NOTES
Patient resting in bed with eyes closed. Wife at bedside. Respirations easy and unlabored. No distress noted. Call light within reach.        Steve Malcolm RN  10/24/22 1779

## 2022-10-25 NOTE — ED TRIAGE NOTES
Pt presents to the ED from home with complaints of feeling short of breath and running a fever that started this afternoon. Pt is normally on 2 L NC, pt is 97% on 2 L NC upon arrival. Pt's temp was 100.5. Pt feels warm to touch. Pt denies having chest pain. Pt is alert and oriented x 4 with respirations even, slightly labored.

## 2022-10-25 NOTE — ED NOTES
Patient resting in bed. Respirations easy and unlabored. Denies further needs at this time. Call light within reach.        Mary Spencer RN  10/24/22 5708

## 2022-10-25 NOTE — ED PROVIDER NOTES
I performed a history and physical examination of the patient and discussed management with the resident. I reviewed the residents note and agree with the documented findings and plan of care. Any areas of disagreement are noted on the chart. I was personally present for the key portions of any procedures. I have documented in the chart those procedures where I was not present during the key portions. I have reviewed the emergency nurses triage note. I agree with the chief complaint, past medical history, past surgical history, allergies, medications, social and family history as documented unless otherwise noted below. Documentation of the HPI, Physical Exam and Medical Decision Making performed by medical students or scribes is based on my personal performance of the HPI, PE and MDM. For Phys Assistant/ Nurse Practitioner cases/documentation I have personally evaluated this patient and have completed at least one if not all key elements of the E/M (history, physical exam, and MDM). My findings are as noted below     Patient presents today for complaints of feeling short of breath, running a fever at home. Patient is normally on oxygen. He is 97% on his 2 L. Patient does have a fever of 100.5. He denies any chest pain. Patient is somewhat confused at bedside. This is not his baseline according to family. .  Patient is otherwise resting comfortably on the cot no apparent distress no physical complaints at this time. XR CHEST PORTABLE   Final Result   Impression:   1. No acute infiltrate.       This document has been electronically signed by: Darrick Kerr MD on    10/24/2022 10:48 PM        Labs Reviewed   COVID-19 & INFLUENZA COMBO - Abnormal; Notable for the following components:       Result Value    SARS-CoV-2 RNA, RT PCR DETECTED (*)     All other components within normal limits   BASIC METABOLIC PANEL W/ REFLEX TO MG FOR LOW K - Abnormal; Notable for the following components:    Glucose 120 (*) All other components within normal limits   CBC WITH AUTO DIFFERENTIAL - Abnormal; Notable for the following components:    WBC 15.1 (*)     Hemoglobin 13.7 (*)     MCHC 31.4 (*)     RDW-CV 17.1 (*)     RDW-SD 53.4 (*)     MPV 8.7 (*)     Segs Absolute 13.3 (*)     Lymphocytes Absolute 0.7 (*)     Immature Grans (Abs) 0.08 (*)     All other components within normal limits   BRAIN NATRIURETIC PEPTIDE   TROPONIN   GLOMERULAR FILTRATION RATE, ESTIMATED   ANION GAP   OSMOLALITY   BLOOD GAS, ARTERIAL   AMMONIA   URINALYSIS WITH REFLEX TO CULTURE         Final diagnoses:   COVID   . I have seen this patient with the resident Dr. Vilma Ellis and agree with her assessment and plan.      Kem Finnegan, DO  10/26/22 7578

## 2022-10-25 NOTE — DISCHARGE INSTRUCTIONS
You are seen for body aches and fever. You were found to have COVID. At this time you are stable for discharge. Please return to the ED if any worsening condition.

## 2022-10-25 NOTE — ED NOTES
Pt up to use urinal and urine sample collected. Repositioned in bed. Denies further needs at this time. Call light within reach.      Sajan Garcia RN  10/25/22 [id number]

## 2022-11-04 ENCOUNTER — TELEPHONE (OUTPATIENT)
Dept: NEUROLOGY | Age: 67
End: 2022-11-04

## 2022-11-04 LAB
FOLATE: 16.2 UG/L
VITAMIN B-12: >4000 PG/ML (ref 200–950)

## 2022-11-04 NOTE — TELEPHONE ENCOUNTER
Wife, on HIPAA, called stating patient is having memory problem for the past 1.5 weeks. Patient was COVID positive on 10/24/22. Last UA on 10/28/22 was normal. CT head on 10/28/22 showed Atrophy and moderate to severe periventricular white matter ischemic change. No evidence for acute infarct. Wife is concerned because patient is not able to recognize her or other family members he see's everyday. He is struggling with daily tasks including making phone calls. Patient is currently being treated by our office for Parkinson's. Patient is scheduled for follow up on 11/11/22. Please advise. Thank you.

## 2022-11-07 NOTE — TELEPHONE ENCOUNTER
[facility] notified and requested to hold off on MRI for now. Per [facility], patient did better over the weekend. [facility] [employer] [facility] will call back if patient does not continue to improve, or if patient condition worsens.

## 2022-11-07 NOTE — TELEPHONE ENCOUNTER
Having recent COVID can cause deconditioning and confusion can happen.  If he is not improving, we can order MRI brain without contrast.   Lizette Azevedo MD

## 2022-11-11 ENCOUNTER — OFFICE VISIT (OUTPATIENT)
Dept: NEUROLOGY | Age: 67
End: 2022-11-11
Payer: COMMERCIAL

## 2022-11-11 VITALS
DIASTOLIC BLOOD PRESSURE: 78 MMHG | HEIGHT: 63 IN | BODY MASS INDEX: 30.3 KG/M2 | HEART RATE: 87 BPM | SYSTOLIC BLOOD PRESSURE: 120 MMHG | WEIGHT: 171 LBS | OXYGEN SATURATION: 97 %

## 2022-11-11 DIAGNOSIS — R49.8 HYPOPHONIA: ICD-10-CM

## 2022-11-11 DIAGNOSIS — R26.89 SHUFFLING GAIT: ICD-10-CM

## 2022-11-11 DIAGNOSIS — R25.8 BRADYKINESIA: ICD-10-CM

## 2022-11-11 DIAGNOSIS — G20 PARKINSON'S DISEASE (HCC): Primary | ICD-10-CM

## 2022-11-11 LAB — VITAMIN B6: 8 NMOL/L (ref 20–125)

## 2022-11-11 PROCEDURE — 3017F COLORECTAL CA SCREEN DOC REV: CPT | Performed by: NURSE PRACTITIONER

## 2022-11-11 PROCEDURE — G8484 FLU IMMUNIZE NO ADMIN: HCPCS | Performed by: NURSE PRACTITIONER

## 2022-11-11 PROCEDURE — 99213 OFFICE O/P EST LOW 20 MIN: CPT | Performed by: NURSE PRACTITIONER

## 2022-11-11 PROCEDURE — 1036F TOBACCO NON-USER: CPT | Performed by: NURSE PRACTITIONER

## 2022-11-11 PROCEDURE — 1123F ACP DISCUSS/DSCN MKR DOCD: CPT | Performed by: NURSE PRACTITIONER

## 2022-11-11 PROCEDURE — G8417 CALC BMI ABV UP PARAM F/U: HCPCS | Performed by: NURSE PRACTITIONER

## 2022-11-11 PROCEDURE — G8427 DOCREV CUR MEDS BY ELIG CLIN: HCPCS | Performed by: NURSE PRACTITIONER

## 2022-11-11 RX ORDER — DILTIAZEM HYDROCHLORIDE 180 MG/1
CAPSULE, EXTENDED RELEASE ORAL
COMMUNITY
Start: 2022-09-29

## 2022-11-11 RX ORDER — DEXAMETHASONE 4 MG/1
TABLET ORAL
COMMUNITY
Start: 2022-11-10

## 2022-11-11 NOTE — PROGRESS NOTES
NEUROLOGY OUT PATIENT FOLLOW UP NOTE:  11/11/20228:27 AM    Lacey Mars is here for follow up for  parkinson's disease. ROS:  Respiratory : no cough, no shortness of breath  Cardiac: no chest pain. No palpitations.   Renal : no flank pain, no hematuria    Skin: no rash      No Known Allergies    Current Outpatient Medications:     dexamethasone (DECADRON) 4 MG tablet, , Disp: , Rfl:     carbidopa-levodopa (SINEMET)  MG per tablet, Take 1 tablet by mouth 3 times daily, Disp: 90 tablet, Rfl: 3    formoterol (PERFOROMIST) 20 MCG/2ML nebulizer solution, , Disp: , Rfl:     midodrine (PROAMATINE) 5 MG tablet, TAKE 1 TABLET BY MOUTH 3 TIMES A DAY, Disp: , Rfl:     DALIRESP 500 MCG tablet, TAKE 1 TABLET BY MOUTH EVERY DAY FOR 90 DAYS, Disp: , Rfl:     Multiple Vitamins-Minerals (OCUVITE PO), Take by mouth, Disp: , Rfl:     potassium chloride 10 MEQ/100ML, Infuse 10 mEq intravenously once, Disp: , Rfl:     aspirin 81 MG tablet, Take 81 mg by mouth daily, Disp: , Rfl:     budesonide (PULMICORT) 0.5 MG/2ML nebulizer suspension, Take 1 ampule by nebulization 2 times daily, Disp: , Rfl:     albuterol (PROVENTIL) (2.5 MG/3ML) 0.083% nebulizer solution, Take 2.5 mg by nebulization every 6 hours as needed for Wheezing, Disp: , Rfl:     albuterol sulfate HFA (PROAIR HFA) 108 (90 BASE) MCG/ACT inhaler, Inhale 2 puffs into the lungs every 4 hours as needed for Wheezing or Shortness of Breath, Disp: 1 Inhaler, Rfl: 3    DILT- MG extended release capsule, TAKE 2 CAPSULES BY MOUTH EVERY DAY, Disp: , Rfl:     cyanocobalamin 1000 MCG/ML injection, 1 ML INJECTION ONCE A MONTH (Patient not taking: Reported on 11/11/2022), Disp: , Rfl:     acetaminophen (TYLENOL) 80 MG chewable tablet, Take 80 mg by mouth every 4 hours as needed for Pain (Patient not taking: Reported on 11/11/2022), Disp: , Rfl:     furosemide (LASIX) 20 MG tablet, Take 20 mg by mouth 2 times daily (Patient not taking: Reported on 11/11/2022), Disp: , Rfl: dilTIAZem (TIAZAC) 180 MG extended release capsule, Take 180 mg by mouth daily (Patient not taking: Reported on 11/11/2022), Disp: , Rfl:     Dextromethorphan-Guaifenesin (MUCINEX DM)  MG TB12, Take by mouth (Patient not taking: Reported on 11/11/2022), Disp: 28 tablet, Rfl: 0    I reviewed the past medical history, allergies, medications, social history and family history. PE:   Vitals:    11/11/22 0817   BP: 120/78   Site: Left Upper Arm   Position: Sitting   Cuff Size: Medium Adult   Pulse: 87   SpO2: 97%   Weight: 171 lb (77.6 kg)   Height: 5' 3\" (1.6 m)     General Appearance:  well developed, well nourished and in no acute distress  Gen: NAD, Language is Intact. Skin: no rash, lesion, dry  to touch. warm  Head: no rash, no icterus  Neck: There is no carotid bruits. The Neck is supple. There is no neck lymphadenopathy. Neuro: CN 2-12 grossly intact with no focal deficits. Power 5/5 Throughout symmetric, Reflexes are  symmetric. Long tracts are intact. Cerebellar exam is Intact. Sensory exam is intact to light touch. Gait is shuffling. no resting tremor, no pinrolling+ve  bradykinesia, +ve Hypohonia, +ve decreased blink rate, no  difficulty exiting chair, +ve decreased arm swing, +vestooped forward, +ve Gait shuffling  Musculoskeletal:  Has no hand arthritis, no limitation of ROM in any of the four extremities.   Lower extremities no edema          DATA:      Results for orders placed or performed in visit on 10/28/22   Blood culture   Result Value Ref Range    Blood Culture, Routine     CBC   Result Value Ref Range    WBC 10.6 (H) 4.4 - 10.5 th/cmm    RBC 4.67 4.50 - 6.00 mil/cmm    Hemoglobin 12.8 (L) 13.5 - 16.5 gm/dL    Hematocrit 38.1 (L) 40.0 - 49.0 %    MCV 81.6 80 - 97 CU GRISELDA    MCH 27.4 (L) 27.5 - 33.0 PG    MCHC 33.6 33.0 - 36.0 gm/dL    RDW 17.3 (H) 12.0 - 16.0 %    Platelets 664 990 - 879 th/cmm    Neutrophils % 74.5 (H) 40 - 70 %    Lymphocytes % 12.7 (L) 15 - 45 %    Monocytes % 7.4 2 - 10 %    Eosinophils % 4.5 0 - 6 %    Basophils % 0.9 0 - 2 %    Nucleated RBCs 0.0 <1 /100 WBC    Absolute Neut # 7900 (H) 1800 - 7700 /cmm    Absolute Lymph # 1300 1000 - 4800 /cmm    Absolute Mono # 800 0 - 800 /cmm    Absolute Eos # 500 0 - 500 /cmm    Absolute Baso # 100 0 - 200 /cmm    Anisocytosis 1+    Ammonia   Result Value Ref Range    Ammonia 39 (H) 11 - 35 umol/L   Procalcitonin   Result Value Ref Range    Procalcitonin 0.07 ng/mL   Basic Metabolic Panel   Result Value Ref Range    Sodium 139 135 - 145 mEq/L    Potassium 4.0 3.6 - 5.0 mEq/L    Chloride 107 101 - 111 mEq/L    CO2 25 21 - 32 mEq/L    Anion Gap 7 4 - 12    Glucose 93 70 - 110 mg/dL    BUN 11 7 - 20 mg/dL    Creatinine 0.76 0.60 - 1.30 mg/dL    Creatinine Clearance >60     Calcium 9.30 8.8 - 10.5 mg/dL   Hepatic Function Panel   Result Value Ref Range    AST 21 15 - 41 IU/L    Alkaline Phosphatase 74 41 - 137 IU/L    Total Bilirubin 0.5 0.2 - 1.0 mg/dL    Bilirubin, Direct 0.1 0.1 - 0.2 mg/dL    Albumin 3.9 3.5 - 5.0 g/dL    Total Protein 7.0 6.2 - 8.0 g/dL    ALT 3 (L) 10 - 40 IU/L   Troponin I High Sensitivity   Result Value Ref Range    Troponin I, High Sensitivity 5 0 - 20 ng/L   Blood Gas, Arterial   Result Value Ref Range    pH 7.45 7.35 - 7.45    PCO2 36 35 - 45 mmHg    PO2 67 (L) 80 - 105 mmHg    HCO3 24.9 22 - 26 mmol/L    Base Excess, Arterial 1 -2 - 3 mmol/L    O2 Sat, Arterial 94 (L) 95 - 98 %    Drawn By [employer] Doll     Site L Radial     DEVICE Nasal Cannula     FIO2 28.00     NOTIFICATION Yes     LITERS PER MINUTE 2.00    Urinalysis with Reflex Culture   Result Value Ref Range    Color, UA Yellow     Appearance Clear     Glucose, Ur Negative Negative    Protein, Urine Negative Negative    Bilirubin Urine Negative Negative    Urobilinogen, Urine 4.0 E.U./dL (H) 0.2 - 1.0    pH, Urine 5.0 5.0 - 8.0    Specific Gravity, UA 1.021 1.000 - 1.03    Urine Hgb Negative Negative    Ketones, Urine Trace (H) Negative    Nitrite, Urine Negative Negative    LEUKOCYTES, UA Negative Negative    WBC, UA 0-5 /HPF    RBC, UA 0-2 /HPF    Squam Epithel, UA None /HPF    Trans Epithel, UA 0-5 /HPF    Ca Oxalate Bisi, UA Present (H)     Mucus, UA Present     Urinalysis Reflex No             Results for orders placed in visit on 10/28/22    CT HEAD WO CONTRAST    Narrative  Ordering Provider: Gene Araujo 1153    Radiology Department  Patient:  Keysha Villegas. :  1955   Sex: [place], [facility] [place]  Location:    [phone]   Unit #:   [id number]  Acct #:  [de-identified]  Ordering Phys: Gene Hilliard PA-C    Exam Date: 10/28/22  Accession #:  [id number]  Exam:  CT   CT Head Without Contrast [id number]  Result: See Report    STUDY:  CT BRAIN WITHOUT CONTRAST  REASON FOR EXAM:   Male, 78years old. AMS, covid (+)/ c/o SOB, coughing,  and confusion x 10/24/2022. Pt is on 2L O2 via nasal cannula at home. Pt  has a hx of COPD. Pt was evaluated [facility]. [facility] during the onset of  symptoms in addition to a fever. The pt tested positive for COVID and was  discharged home. The pt did not receive Paxlovid. Family wants the pt  reevaluated due to worsening confusion. The pt has been talking about  working when he hasn''t worked in years. The pt also referred to his home  as a barn. Pt''s family is worried about a possible UTI; however, the pt  denies any urinary complaints. The pt is saying places he is not and  calling people by the wrong names. The pt denies having chest pain. Family  denies fever on behalf of the pt. Pt and his family have no further  complaints or comments at this time.   RADIATION DOSAGE (If Supplied By Facility):  CTDIvol = ( ) mGy, DLP = (  750 ) mGycm  TECHNIQUE:   Transaxial CT imaging of the brain was performed without  administration of intravenous contrast material.  Individualized dose optimization techniques were used for this CT.  COMPARISON:   MRI of the brain 05/27/2022  ___________________________________  FINDINGS:    Normal soft tissue structures. Normal calvarium. Calcific plaquing of cavernous carotids. Atrophy and moderate to severe periventricular white matter ischemic  changes. .  Normal basal ganglia and thalami. Normal brainstem. Normal  cerebellum. There is no intracranial hemorrhage. There are no findings of an acute  ischemic infarction. Mild mucosal thickening of ethmoid air cells bilaterally. .  ___________________________________    IMPRESSION:  Atrophy and moderate to severe periventricular white matter ischemic  change. No evidence for acute infarct    Electronically Signed:  Pricilla Gustafson MD  2022/10/28 at 16:10 EDT  Reading Location ID and State: 1006 / [place]  Tel [phone], Service support  [phone], Fax [phone]          cc: Sean Flowers MD; Fabián Ang PA-C      Dictated by:  Kristofer Galindo MD on 10/28/22 1610  Technologist:   RT(R)(CT) José Redd  Transcribed by:  Kristofer Galindo on 10/28/22 1610    Report Signed by:  Slade AHMADI on 10/28/22 1610         Assessment:     Diagnosis Orders   1. Parkinson's disease ([address]        2. Bradykinesia        3. Hypophonia        4. Shuffling gait             Follow up for parkinson's disease, bradykinesia, hypophonia. No falls. No dysphagia. He did not tolerate the sinemet CR due to tachycardia. He is on sinemet 25/100 mg three times a day. No side effects noted. He also had Covid in there interim since last seen. His wife reports he can have occasional confusion where he thinks he is at work this started following Covid infection, however has improved since onset. No hallucination. He is also having episodes of low blood pressure, he is on midodrine. He was counseled on the importance of avoiding standing from prolonged periods of time, taking plenty of fluids with electrolytes, and wearing compression stockings.  After detailed discussion with patient we agreed on the following plan. Plan:  Change Sinemet 25/100 mg to 1.5 tablets three times a day   Follow through with physical and speech therapy recommendation  Wear support hose. Avoid standing for prolonged periods of time  Take plenty of fluids with electrolytes   Obtain with Vitamin B12, folate from path lab  Obtain with Vitamin B6 level from path lab  No driving for your safety and the safety of others  You need to stay physically active  Follow up in 6 weeks or sooner if needed. Call if any questions or concerns.     Total time 26 min    ANUJA Menon - CNP

## 2022-11-15 ENCOUNTER — TELEPHONE (OUTPATIENT)
Dept: NEUROLOGY | Age: 67
End: 2022-11-15

## 2022-11-15 NOTE — TELEPHONE ENCOUNTER
----- Message from ANUJA Aquino CNP sent at 11/15/2022 11:11 AM EST -----  Please let patient know his vitamin B6 level is low=8. He needs to start on vitamin B6 supplements, 50 mg daily, over the counter.   Please have him call the office in 2-3 months to obtain repeat vitamin B6 level  Priscilla Gao CNP

## 2022-12-17 DIAGNOSIS — G20 PARKINSON'S DISEASE (HCC): ICD-10-CM

## 2022-12-19 ENCOUNTER — OFFICE VISIT (OUTPATIENT)
Dept: NEUROLOGY | Age: 67
End: 2022-12-19
Payer: MEDICARE

## 2022-12-19 VITALS
SYSTOLIC BLOOD PRESSURE: 130 MMHG | HEART RATE: 110 BPM | DIASTOLIC BLOOD PRESSURE: 70 MMHG | OXYGEN SATURATION: 99 % | WEIGHT: 188 LBS | BODY MASS INDEX: 32.1 KG/M2 | HEIGHT: 64 IN

## 2022-12-19 DIAGNOSIS — R25.8 BRADYKINESIA: ICD-10-CM

## 2022-12-19 DIAGNOSIS — R49.8 HYPOPHONIA: ICD-10-CM

## 2022-12-19 DIAGNOSIS — G20 PARKINSON'S DISEASE (HCC): Primary | ICD-10-CM

## 2022-12-19 DIAGNOSIS — R26.89 SHUFFLING GAIT: ICD-10-CM

## 2022-12-19 PROCEDURE — 99213 OFFICE O/P EST LOW 20 MIN: CPT | Performed by: NURSE PRACTITIONER

## 2022-12-19 PROCEDURE — G8484 FLU IMMUNIZE NO ADMIN: HCPCS | Performed by: NURSE PRACTITIONER

## 2022-12-19 PROCEDURE — 3017F COLORECTAL CA SCREEN DOC REV: CPT | Performed by: NURSE PRACTITIONER

## 2022-12-19 PROCEDURE — G8417 CALC BMI ABV UP PARAM F/U: HCPCS | Performed by: NURSE PRACTITIONER

## 2022-12-19 PROCEDURE — 1036F TOBACCO NON-USER: CPT | Performed by: NURSE PRACTITIONER

## 2022-12-19 PROCEDURE — 1123F ACP DISCUSS/DSCN MKR DOCD: CPT | Performed by: NURSE PRACTITIONER

## 2022-12-19 PROCEDURE — G8427 DOCREV CUR MEDS BY ELIG CLIN: HCPCS | Performed by: NURSE PRACTITIONER

## 2022-12-19 NOTE — PATIENT INSTRUCTIONS
Change Sinemet 25/100 mg to 2 tablets three times a day, take every 5 hours while awake. Follow through with physical and speech therapy recommendation  Wear support hose. Avoid standing for prolonged periods of time  Take plenty of fluids with electrolytes    No driving for your safety and the safety of others  You need to stay physically active  Follow up in 6 weeks or sooner if needed. Call if any questions or concerns.

## 2022-12-19 NOTE — PROGRESS NOTES
NEUROLOGY OUT PATIENT FOLLOW UP NOTE:  12/19/20223:00 PM    Muklu Vanessa is here for follow up for  parkinson's disease. ROS:  Respiratory : no cough, no shortness of breath  Cardiac: no chest pain. No palpitations.   Renal : no flank pain, no hematuria    Skin: no rash      No Known Allergies    Current Outpatient Medications:     DILT- MG extended release capsule, TAKE 2 CAPSULES BY MOUTH EVERY DAY, Disp: , Rfl:     dexamethasone (DECADRON) 4 MG tablet, , Disp: , Rfl:     carbidopa-levodopa (SINEMET)  MG per tablet, Take 1.5 tablets by mouth 3 times daily, Disp: 135 tablet, Rfl: 3    cyanocobalamin 1000 MCG/ML injection, , Disp: , Rfl:     formoterol (PERFOROMIST) 20 MCG/2ML nebulizer solution, , Disp: , Rfl:     midodrine (PROAMATINE) 5 MG tablet, TAKE 1 TABLET BY MOUTH 3 TIMES A DAY, Disp: , Rfl:     DALIRESP 500 MCG tablet, TAKE 1 TABLET BY MOUTH EVERY DAY FOR 90 DAYS, Disp: , Rfl:     Multiple Vitamins-Minerals (OCUVITE PO), Take by mouth, Disp: , Rfl:     potassium chloride 10 MEQ/100ML, Infuse 10 mEq intravenously once, Disp: , Rfl:     aspirin 81 MG tablet, Take 81 mg by mouth daily, Disp: , Rfl:     acetaminophen (TYLENOL) 80 MG chewable tablet, Take 80 mg by mouth every 4 hours as needed for Pain, Disp: , Rfl:     furosemide (LASIX) 20 MG tablet, Take 20 mg by mouth 2 times daily, Disp: , Rfl:     budesonide (PULMICORT) 0.5 MG/2ML nebulizer suspension, Take 1 ampule by nebulization 2 times daily, Disp: , Rfl:     albuterol (PROVENTIL) (2.5 MG/3ML) 0.083% nebulizer solution, Take 2.5 mg by nebulization every 6 hours as needed for Wheezing, Disp: , Rfl:     albuterol sulfate HFA (PROAIR HFA) 108 (90 BASE) MCG/ACT inhaler, Inhale 2 puffs into the lungs every 4 hours as needed for Wheezing or Shortness of Breath, Disp: 1 Inhaler, Rfl: 3    dilTIAZem (TIAZAC) 180 MG extended release capsule, Take 180 mg by mouth daily (Patient not taking: No sig reported), Disp: , Rfl: Dextromethorphan-Guaifenesin (MUCINEX DM)  MG TB12, Take by mouth (Patient not taking: No sig reported), Disp: 28 tablet, Rfl: 0    I reviewed the past medical history, allergies, medications, social history and family history. PE:   Vitals:    22 1449   BP: 130/70   Site: Left Upper Arm   Position: Sitting   Cuff Size: Large Adult   Pulse: (!) 110   SpO2: 99%   Weight: 188 lb (85.3 kg)   Height: 5' 3.5\" (1.613 m)     General Appearance:  well developed, well nourished and in no acute distress, he is wearing nasal cannula oxygen  Gen: NAD, Language is Intact. Skin: no rash, lesion, dry  to touch. warm  Head: no rash, no icterus  Neck: There is no carotid bruits. The Neck is supple. There is no neck lymphadenopathy. Neuro: CN 2-12 grossly intact with no focal deficits. Power 5/5 Throughout symmetric, Reflexes are  symmetric. Long tracts are intact. Cerebellar exam is Intact. Sensory exam is intact to light touch. Gait is shuffling. no resting tremor, no pinrolling, +ve  bradykinesia, +ve Hypohonia, +ve decreased blink rate, no  difficulty exiting chair, +ve decreased arm swing, +vestooped forward, no Gait shuffling  Musculoskeletal:  Has no hand arthritis, no limitation of ROM in any of the four extremities. Lower extremities no edema          DATA:        Results for orders placed or performed in visit on 22   Vitamin B12 & Folate   Result Value Ref Range    Vitamin B-12 >4000 (H) 200 - 950 pg/mL    Folate 16.2 SEE BELOW ug/L   Vitamin B6   Result Value Ref Range    Vitamin B6, Plasma 8 (L) 20 - 125 nmol/L          Results for orders placed in visit on 10/28/22    CT HEAD WO CONTRAST    Narrative  Ordering Provider: Mirna Araujo 1153    Radiology Department  Patient:  Alessandra Villegas. :  1955   Sex:   [place], [address] [facility]  Location:    [phone]   Unit #:   [id number]  Acct #:  [de-identified]  Ordering Phys: Mirna Avalos JOSE ELIAS    Exam Date: 10/28/22  Accession #:  [id number]  Exam:  CT   CT Head Without Contrast [id number]  Result: See Report    STUDY:  CT BRAIN WITHOUT CONTRAST  REASON FOR EXAM:   Male, 78years old. AMS, covid (+)/ c/o SOB, coughing,  and confusion x 10/24/2022. Pt is on 2L O2 via nasal cannula at home. Pt  has a hx of COPD. Pt was evaluated at [facility]. [facility]ie[facility]s during the onset of  symptoms in addition to a fever. The pt tested positive for COVID and was  discharged home. The pt did not receive Paxlovid. Family wants the pt  reevaluated due to worsening confusion. The pt has been talking about  working when he hasn''t worked in years. The pt also referred to his home  as a barn. Pt''s family is worried about a possible UTI; however, the pt  denies any urinary complaints. The pt is saying places he is not and  calling people by the wrong names. The pt denies having chest pain. Family  denies fever on behalf of the pt. Pt and his family have no further  complaints or comments at this time. RADIATION DOSAGE (If Supplied By Facility):  CTDIvol = ( ) mGy, DLP = (  750 ) mGycm  TECHNIQUE:   Transaxial CT imaging of the brain was performed without  administration of intravenous contrast material.  Individualized dose optimization techniques were used for this CT.  COMPARISON:   MRI of the brain 05/27/2022  ___________________________________  FINDINGS:    Normal soft tissue structures. Normal calvarium. Calcific plaquing of cavernous carotids. Atrophy and moderate to severe periventricular white matter ischemic  changes. .  Normal basal ganglia and thalami. Normal brainstem. Normal  cerebellum. There is no intracranial hemorrhage. There are no findings of an acute  ischemic infarction. Mild mucosal thickening of ethmoid air cells bilaterally. .  ___________________________________    IMPRESSION:  Atrophy and moderate to severe periventricular white matter ischemic  change.   No evidence for acute infarct    Electronically Signed:  Marycruz Muhammad MD  2022/10/28 at 16:10 EDT  Reading Location ID and State: 1006 / [place]  Tel [phone], Service support  [phone], Fax [phone]          cc: Enrique Amaro MD; Abelardo Carmona PA-C      Dictated by:  Samantha Dickey MD on 10/28/22 1610  Technologist:   (R)(CT) Halley Emery  Transcribed by:  Samantha Dickey on 10/28/22 1610    Report Signed by:  Veronique AHMADI on 10/28/22 1610         Assessment:     Diagnosis Orders   1. Parkinson's disease ([address]        2. Bradykinesia        3. Hypophonia        4. Shuffling gait             Follow up for parkinson's disease, bradykinesia, hypophonia. No falls. No dysphagia. He did not tolerate the sinemet CR due to tachycardia. He is on sinemet 25/100 mg 1.5 tablets three times a day. No side effects noted. His wife reports she has noticed improvement in his eating and appears to be more animated with his facial expressions, however there does appear to be more room for improvement. He is staying physically active with an on the floor elliptical. After detailed discussion with patient and his wife we agreed on the following plan. Plan:   Change Sinemet 25/100 mg to 2 tablets three times a day, take every 5 hours while awake. Follow through with physical and speech therapy recommendations  Wear support hose. Avoid standing for prolonged periods of time  Take plenty of fluids with electrolytes    No driving for your safety and the safety of others  You need to stay physically active  Follow up in 6 weeks or sooner if needed. Call if any questions or concerns.        Total time 24 min    ANUJA Soares - CNP

## 2023-01-01 DIAGNOSIS — G20.A1 PARKINSON'S DISEASE: ICD-10-CM

## 2023-01-01 RX ORDER — ROTIGOTINE 3 MG/24H
3 PATCH, EXTENDED RELEASE TRANSDERMAL EVERY 24 HOURS
Qty: 90 PATCH | Refills: 1 | Status: SHIPPED | OUTPATIENT
Start: 2023-01-01 | End: 2023-10-13

## 2023-01-30 ENCOUNTER — HOSPITAL ENCOUNTER (OUTPATIENT)
Dept: MRI IMAGING | Age: 68
Discharge: HOME | End: 2023-01-30

## 2023-01-30 ENCOUNTER — OFFICE VISIT (OUTPATIENT)
Dept: NEUROLOGY | Age: 68
End: 2023-01-30

## 2023-01-30 VITALS
BODY MASS INDEX: 34.78 KG/M2 | OXYGEN SATURATION: 97 % | DIASTOLIC BLOOD PRESSURE: 70 MMHG | SYSTOLIC BLOOD PRESSURE: 110 MMHG | HEIGHT: 62 IN | WEIGHT: 189 LBS | HEART RATE: 87 BPM

## 2023-01-30 DIAGNOSIS — Z00.6 ENCOUNTER FOR EXAMINATION FOR NORMAL COMPARISON AND CONTROL IN CLINICAL RESEARCH PROGRAM: ICD-10-CM

## 2023-01-30 DIAGNOSIS — R49.8 HYPOPHONIA: ICD-10-CM

## 2023-01-30 DIAGNOSIS — G20 PARKINSON'S DISEASE (HCC): Primary | ICD-10-CM

## 2023-01-30 DIAGNOSIS — R26.89 SHUFFLING GAIT: ICD-10-CM

## 2023-01-30 DIAGNOSIS — R25.8 BRADYKINESIA: ICD-10-CM

## 2023-01-30 NOTE — PATIENT INSTRUCTIONS
Obtain MRI brain from [facility]. Sinemet 25/100 mg to 2 tablets three times a day, take every 5 hours while awake. Follow through with physical and speech therapy recommendations  Wear support hose. Avoid standing for prolonged periods of time  Take plenty of fluids with electrolytes    No driving for your safety and the safety of others  Stay physically active  Follow up in 2 weeks or sooner if needed. Call if any questions or concerns.

## 2023-01-30 NOTE — PROGRESS NOTES
NEUROLOGY OUT PATIENT FOLLOW UP NOTE:  1/30/20238:52 AM    Lila Hodge is here for follow up for  parkinson's disease. No Known Allergies    Current Outpatient Medications:     Multiple Vitamins-Minerals (OCUVITE PO), Take by mouth, Disp: , Rfl:     carbidopa-levodopa (SINEMET)  MG per tablet, Take 2 tablets by mouth 3 times daily, Disp: 180 tablet, Rfl: 3    DILT- MG extended release capsule, TAKE 2 CAPSULES BY MOUTH EVERY DAY, Disp: , Rfl:     cyanocobalamin 1000 MCG/ML injection, , Disp: , Rfl:     formoterol (PERFOROMIST) 20 MCG/2ML nebulizer solution, , Disp: , Rfl:     midodrine (PROAMATINE) 5 MG tablet, TAKE 1 TABLET BY MOUTH 3 TIMES A DAY, Disp: , Rfl:     DALIRESP 500 MCG tablet, TAKE 1 TABLET BY MOUTH EVERY DAY FOR 90 DAYS, Disp: , Rfl:     potassium chloride 10 MEQ/100ML, Infuse 10 mEq intravenously once, Disp: , Rfl:     aspirin 81 MG tablet, Take 81 mg by mouth daily, Disp: , Rfl:     acetaminophen (TYLENOL) 80 MG chewable tablet, Take 80 mg by mouth every 4 hours as needed for Pain, Disp: , Rfl:     furosemide (LASIX) 20 MG tablet, Take 20 mg by mouth 2 times daily, Disp: , Rfl:     budesonide (PULMICORT) 0.5 MG/2ML nebulizer suspension, Take 1 ampule by nebulization 2 times daily, Disp: , Rfl:     dilTIAZem (TIAZAC) 180 MG extended release capsule, Take 180 mg by mouth daily, Disp: , Rfl:     albuterol (PROVENTIL) (2.5 MG/3ML) 0.083% nebulizer solution, Take 2.5 mg by nebulization every 6 hours as needed for Wheezing, Disp: , Rfl:     albuterol sulfate HFA (PROAIR HFA) 108 (90 BASE) MCG/ACT inhaler, Inhale 2 puffs into the lungs every 4 hours as needed for Wheezing or Shortness of Breath, Disp: 1 Inhaler, Rfl: 3    dexamethasone (DECADRON) 4 MG tablet, , Disp: , Rfl:     I reviewed the past medical history, allergies, medications, social history and family history.        PE:   Vitals:    01/30/23 0830   BP: 110/70   Site: Left Upper Arm   Position: Sitting   Cuff Size: Large Adult Pulse: 87   SpO2: 97%   Weight: 189 lb (85.7 kg)   Height: 5' 2\" (1.575 m)     General Appearance:  well developed, well nourished and in no acute distress, he is wearing nasal cannula oxygen  Gen: NAD, Language is Intact. Skin: no rash, lesion, dry  to touch. warm  Head: no rash, no icterus  Neck: There is no carotid bruits. The Neck is supple. There is no neck lymphadenopathy. Neuro: CN 2-12 grossly intact with no focal deficits. Power 5/5 Throughout symmetric, Reflexes are  symmetric. Long tracts are intact. Cerebellar exam is Intact. Sensory exam is intact to light touch. Gait is shuffling. no resting tremor, no pinrolling, +ve  bradykinesia, +ve Hypohonia, +ve decreased blink rate, no  difficulty exiting chair, +ve decreased arm swing, +vestooped forward. Gait shuffling  Musculoskeletal:  Has no hand arthritis, no limitation of ROM in any of the four extremities. Lower extremities no edema          DATA:        Results for orders placed or performed in visit on 22   Vitamin B12 & Folate   Result Value Ref Range    Vitamin B-12 >4000 (H) 200 - 950 pg/mL    Folate 16.2 SEE BELOW ug/L   Vitamin B6   Result Value Ref Range    Vitamin B6, Plasma 8 (L) 20 - 125 nmol/L          Results for orders placed in visit on 10/28/22    CT HEAD WO CONTRAST    Narrative  Ordering Provider: Haydee Araujo [id number]    Radiology Department  [facility] :  1955   Sex: [place], [address] [place] [address]  Location:  [phone]   Unit #:   [id number]  [facility]  [facility]  Ordering Phys: Haydee BUSTILLOS-CHANDA    Exam Date: 10/28/22  Accession #:  [id number]  Exam:  CT   CT Head Without Contrast 08007  Result: See Report    STUDY:  CT BRAIN WITHOUT CONTRAST  REASON FOR EXAM:   Male, 78years old. AMS, covid (+)/ c/o SOB, coughing,  and confusion x 10/24/2022. Pt is on 2L O2 via nasal cannula at home. Pt  has a hx of COPD.  Pt was evaluated at [address] [place] [address]. Laurie''s during the onset of  symptoms in addition to a fever. The pt tested positive for COVID and was  discharged home. The pt did not receive Paxlovid. Family wants the pt  reevaluated due to worsening confusion. The pt has been talking about  working when he hasn''t worked in years. The pt also referred to his home  as a barn. Pt''s family is worried about a possible UTI; however, the pt  denies any urinary complaints. The pt is saying places he is not and  calling people by the wrong names. The pt denies having chest pain. Family  denies fever on behalf of the pt. Pt and his family have no further  complaints or comments at this time. RADIATION DOSAGE (If Supplied By Facility):  CTDIvol = ( ) mGy, DLP = (  750 ) mGycm  TECHNIQUE:   Transaxial CT imaging of the brain was performed without  administration of intravenous contrast material.  Individualized dose optimization techniques were used for this CT.  COMPARISON:   MRI of the brain 05/27/2022  ___________________________________  FINDINGS:    Normal soft tissue structures. Normal calvarium. Calcific plaquing of cavernous carotids. Atrophy and moderate to severe periventricular white matter ischemic  changes. .  Normal basal ganglia and thalami. Normal brainstem. Normal  cerebellum. There is no intracranial hemorrhage. There are no findings of an acute  ischemic infarction. Mild mucosal thickening of ethmoid air cells bilaterally. .  ___________________________________    IMPRESSION:  Atrophy and moderate to severe periventricular white matter ischemic  change. No evidence for acute infarct    Electronically Signed:  Homa Pat MD  2022/10/28 at 16:10 EDT  Reading Location ID and State: 1006 / [place]  Tel [phone], Service support  [phone], Fax [phone]  cc:  Debi Marcus MD; Piero Delacruz PA-C  Dictated by:  Steven Tapia MD on 10/28/22 9850  Technologist:   RT(R)(CT) Félix Perry  Transcribed by:  Steven Tapia on 10/28/22 1610    Report Signed by:  Crys Faulkner on 10/28/22 1610         Assessment:     Diagnosis Orders   1. Parkinson's disease ([address]        2. Bradykinesia        3. Hypophonia        4. Shuffling gait             Follow up for parkinson's disease, bradykinesia, hypophonia. No falls. No dysphagia. He is doing better with the movement since the increase of the Sinemet, no side effects to the increase. He had and episode of confusion identified his wife as his sister, calls his wife on her cell while she is next to him. He did not have much sleep at night. He is alert now. He is oriented. He has orientation to location, not reason for being in office. Not oriented to month, year is 2008. Suspect PD dementia. His symptoms apparently got worse after COVID. Apparently had MRI at [facility] done late last year. After detailed discussion with patient we agreed on the following plan. Plan:  Obtain MRI brain from [facility]. Sinemet 25/100 mg to 2 tablets three times a day, take every 5 hours while awake. Follow through with physical and speech therapy recommendations  Wear support hose. Avoid standing for prolonged periods of time  Take plenty of fluids with electrolytes    No driving for your safety and the safety of others  Stay physically active  Follow up in 2 weeks or sooner if needed. Call if any questions or concerns.        Total time 33 min    Marylen Marlin, MD

## 2023-02-13 ENCOUNTER — TELEPHONE (OUTPATIENT)
Dept: NEUROLOGY | Age: 68
End: 2023-02-13

## 2023-02-13 NOTE — TELEPHONE ENCOUNTER
Patient has previously scheduled appointment on 2/17/23. Wife notified and verbalized understanding.

## 2023-02-13 NOTE — TELEPHONE ENCOUNTER
Wife called stating for the past couple of days, patient is having increased confusion. Patient keeps calling his wife his sister. Patient thought he was at work this morning, when he was at home. This morning he was confused on how to use his nebulizer machine that he has routinely used in the past. MRI brain 5/27/22 report and images now available to review. Please advise. Thank you.

## 2023-02-17 ENCOUNTER — OFFICE VISIT (OUTPATIENT)
Dept: NEUROLOGY | Age: 68
End: 2023-02-17

## 2023-02-17 VITALS
HEART RATE: 89 BPM | WEIGHT: 181 LBS | DIASTOLIC BLOOD PRESSURE: 74 MMHG | HEIGHT: 63 IN | SYSTOLIC BLOOD PRESSURE: 122 MMHG | OXYGEN SATURATION: 97 % | BODY MASS INDEX: 32.07 KG/M2

## 2023-02-17 DIAGNOSIS — G20 DEMENTIA ASSOCIATED WITH PARKINSON'S DISEASE (HCC): ICD-10-CM

## 2023-02-17 DIAGNOSIS — G20 PARKINSON'S DISEASE (HCC): Primary | ICD-10-CM

## 2023-02-17 DIAGNOSIS — R25.8 BRADYKINESIA: ICD-10-CM

## 2023-02-17 DIAGNOSIS — R26.89 SHUFFLING GAIT: ICD-10-CM

## 2023-02-17 DIAGNOSIS — R49.8 HYPOPHONIA: ICD-10-CM

## 2023-02-17 DIAGNOSIS — F02.80 DEMENTIA ASSOCIATED WITH PARKINSON'S DISEASE (HCC): ICD-10-CM

## 2023-02-17 RX ORDER — POTASSIUM CHLORIDE 750 MG/1
TABLET, EXTENDED RELEASE ORAL
COMMUNITY
Start: 2023-02-13

## 2023-02-17 RX ORDER — ARFORMOTEROL TARTRATE 15 UG/2ML
SOLUTION RESPIRATORY (INHALATION)
COMMUNITY
Start: 2016-12-06

## 2023-02-17 RX ORDER — ROTIGOTINE 2 MG/24H
1 PATCH, EXTENDED RELEASE TRANSDERMAL DAILY
Qty: 30 PATCH | Refills: 3 | Status: SHIPPED | OUTPATIENT
Start: 2023-02-17

## 2023-02-17 NOTE — PATIENT INSTRUCTIONS
MRI brain WO contrast  EEG  Urine with reflex to culture  CBC, HFP   Vitamin B6 level  Continue Sinemet 25/100 mg to 2 tablets three times a day, take every 5 hours while awake, Take at 6am, 11am, and 3pm  Start Neupro patch 2 mg every 24 hours  Follow through with physical and speech therapy recommendations  Wear support hose. Avoid standing for prolonged periods of time  Take plenty of fluids with electrolytes   No driving for your safety and the safety of others  Stay physically active  Follow up in 2-3 weeks or sooner if needed.

## 2023-02-17 NOTE — PROGRESS NOTES
NEUROLOGY OUT PATIENT FOLLOW UP NOTE:  2/17/20239:22 AM    Elana Moore is here for follow up for parkinson's disease. ROS:  Respiratory : no cough, no shortness of breath  Cardiac: no chest pain. No palpitations.   Renal : no flank pain, no hematuria    Skin: no rash      No Known Allergies    Current Outpatient Medications:     arformoterol tartrate (BROVANA) 15 MCG/2ML NEBU, Inhale into the lungs, Disp: , Rfl:     KLOR-CON M10 10 MEQ extended release tablet, TAKE 1 TABLET BY MOUTH EVERY DAY FOR 90 DAYS, Disp: , Rfl:     Multiple Vitamins-Minerals (OCUVITE PO), Take by mouth, Disp: , Rfl:     carbidopa-levodopa (SINEMET)  MG per tablet, Take 2 tablets by mouth 3 times daily, Disp: 180 tablet, Rfl: 3    DILT- MG extended release capsule, TAKE 2 CAPSULES BY MOUTH EVERY DAY, Disp: , Rfl:     dexamethasone (DECADRON) 4 MG tablet, , Disp: , Rfl:     cyanocobalamin 1000 MCG/ML injection, , Disp: , Rfl:     formoterol (PERFOROMIST) 20 MCG/2ML nebulizer solution, , Disp: , Rfl:     midodrine (PROAMATINE) 5 MG tablet, TAKE 1 TABLET BY MOUTH 3 TIMES A DAY, Disp: , Rfl:     DALIRESP 500 MCG tablet, TAKE 1 TABLET BY MOUTH EVERY DAY FOR 90 DAYS, Disp: , Rfl:     potassium chloride 10 MEQ/100ML, Infuse 10 mEq intravenously once, Disp: , Rfl:     aspirin 81 MG tablet, Take 81 mg by mouth daily, Disp: , Rfl:     acetaminophen (TYLENOL) 80 MG chewable tablet, Take 80 mg by mouth every 4 hours as needed for Pain, Disp: , Rfl:     furosemide (LASIX) 20 MG tablet, Take 20 mg by mouth 2 times daily, Disp: , Rfl:     budesonide (PULMICORT) 0.5 MG/2ML nebulizer suspension, Take 1 ampule by nebulization 2 times daily, Disp: , Rfl:     dilTIAZem (TIAZAC) 180 MG extended release capsule, Take 180 mg by mouth daily, Disp: , Rfl:     albuterol (PROVENTIL) (2.5 MG/3ML) 0.083% nebulizer solution, Take 2.5 mg by nebulization every 6 hours as needed for Wheezing, Disp: , Rfl:     albuterol sulfate HFA (PROAIR HFA) 108 (90 BASE) MCG/ACT inhaler, Inhale 2 puffs into the lungs every 4 hours as needed for Wheezing or Shortness of Breath, Disp: 1 Inhaler, Rfl: 3    I reviewed the past medical history, allergies, medications, social history and family history. PE:   Vitals:    23 0915   BP: 122/74   Site: Left Upper Arm   Position: Sitting   Cuff Size: Medium Adult   Pulse: 89   SpO2: 97%   Weight: 181 lb (82.1 kg)   Height: 5' 3\" (1.6 m)     General Appearance:  well developed, well nourished and in no acute distress, he is wearing nasal cannula oxygen  Gen: NAD, Language is Intact. Skin: no rash, lesion, dry  to touch. warm  Head: no rash, no icterus  Neck: There is no carotid bruits. The Neck is supple. There is no neck lymphadenopathy. Neuro: CN 2-12 grossly intact with no focal deficits. Power 5/5 Throughout symmetric, Reflexes are  symmetric. Long tracts are intact. Cerebellar exam is Intact. Sensory exam is intact to light touch. Gait is shuffling. no resting tremor, no pinrolling, +ve  bradykinesia, +ve Hypohonia, +ve decreased blink rate, no  difficulty exiting chair, +ve decreased arm swing, +vestooped forward. Gait guarded, no shuffling  Musculoskeletal:  Has no hand arthritis, no limitation of ROM in any of the four extremities. Lower extremities no edema          DATA:         Results for orders placed or performed in visit on 22   Vitamin B12 & Folate   Result Value Ref Range    Vitamin B-12 >4000 (H) 200 - 950 pg/mL    Folate 16.2 SEE BELOW ug/L   Vitamin B6   Result Value Ref Range    Vitamin B6, Plasma 8 (L) 20 - 125 nmol/L             Results for orders placed in visit on 23    MRI BRAIN W WO CONTRAST    No results found for this or any previous visit. Results for orders placed in visit on 10/28/22    CT HEAD WO CONTRAST    Narrative  Ordering Provider: Yosvany Mendiola    Radiology Department  Patient:  Poly Yoder.   :  1955   Sex: [place], [address] [place] [address]  Location:    [phone]   Unit #:   [id number]  Acct #:  [de-identified]  Ordering Phys: Shu Todd JOSE ELIAS    Exam Date: 10/28/22  Accession #:  [id number]  Exam:  CT   CT Head Without Contrast [id number]  Result: See Report    STUDY:  CT BRAIN WITHOUT CONTRAST  REASON FOR EXAM:   Male, 78years old. AMS, covid (+)/ c/o SOB, coughing,  and confusion x 10/24/2022. Pt is on 2L O2 via nasal cannula at home. Pt  has a hx of COPD. Pt was evaluated at [facility]. Laurie''s during the onset of  symptoms in addition to a fever. The pt tested positive for COVID and was  discharged home. The pt did not receive Paxlovid. Family wants the pt  reevaluated due to worsening confusion. The pt has been talking about  working when he hasn''t worked in years. The pt also referred to his home  as a barn. Pt''s family is worried about a possible UTI; however, the pt  denies any urinary complaints. The pt is saying places he is not and  calling people by the wrong names. The pt denies having chest pain. Family  denies fever on behalf of the pt. Pt and his family have no further  complaints or comments at this time. RADIATION DOSAGE (If Supplied By Facility):  CTDIvol = ( ) mGy, DLP = (  750 ) mGycm  TECHNIQUE:   Transaxial CT imaging of the brain was performed without  administration of intravenous contrast material.  Individualized dose optimization techniques were used for this CT.  COMPARISON:   MRI of the brain 05/27/2022  ___________________________________  FINDINGS:    Normal soft tissue structures. Normal calvarium. Calcific plaquing of cavernous carotids. Atrophy and moderate to severe periventricular white matter ischemic  changes. .  Normal basal ganglia and thalami. Normal brainstem. Normal  cerebellum. There is no intracranial hemorrhage. There are no findings of an acute  ischemic infarction.     Mild mucosal thickening of ethmoid air cells bilaterally..  ___________________________________    IMPRESSION:  Atrophy and moderate to severe periventricular white matter ischemic  change.  No evidence for acute infarct    Electronically Signed:  Norman Wilkins MD  2022/10/28 at 16:10 EDT  Reading Location ID and State: 1006 / [place]  Tel [phone], Service support  [phone], Fax [phone]          cc: Nessa Amaya MD; Grady Guido PA-C      Dictated by:  Norman Wilkins MD on 10/28/22 1610  Technologist:   RT(R)(CT) Melanie Castro  Transcribed by:  Norman Wilkins on 10/28/22 1610    Report Signed by:  Norman Wilkins MD on 10/28/22 1610         Assessment:     Diagnosis Orders   1. Parkinson's disease (HCC)        2. Hypophonia        3. Shuffling gait        4. 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 No falls. No dysphagia.  No hallucination. He is doing better with the movement since the increase of the Sinemet, no side effects to the increase.He is having more confusion. His wife reports he is getting up in the middle of the night getting dress and trying to start his day. He can mistake his wife for his sister. He can also think he is at work. His wife shared with me that she is giving him sinemet 25/100 mg 2 tablets at 6am, 11am, 1 tablet at 5 pm and 1 tablet at 6pm and then he is going to bed at 8 pm.  I shared with his wife he should be taking the sinemet every 4-5 hours, but not right before bed. We agreed on changing sinemet dosing to 6am, 11am, and 3pm and starting him on neupro patch as he sometimes has trouble with his ambulation if he gets up in the middle of the night to use the restroom. He has tired sinemet CR and did not tolerate it. Suspect his confusion is related to PD dementia, however we will obtain work up to rule out other causes. After detailed discussion with patient and his wife we agreed on the following  plan.         Plan:  MRI brain WO contrast  EEG  Urine with reflex to culture  CBC, HFP   Vitamin B6 level  Continue Sinemet 25/100 mg to 2 tablets three times a day, take every 5 hours while awake, Take at 6am, 11am, and 3pm  Start Neupro patch 2 mg every 24 hours  Follow through with physical and speech therapy recommendations  Wear support hose. Avoid standing for prolonged periods of time  Take plenty of fluids with electrolytes   No driving for your safety and the safety of others  Stay physically active  Follow up in 2-3 weeks or sooner if needed.        Total time 36 min    Billy Humphreys, APRN - CNP

## 2023-02-22 ENCOUNTER — HOSPITAL ENCOUNTER (OUTPATIENT)
Dept: NEUROLOGY | Age: 68
Discharge: HOME | End: 2023-02-22
Payer: COMMERCIAL

## 2023-02-22 DIAGNOSIS — R26.89 SHUFFLING GAIT: ICD-10-CM

## 2023-02-22 DIAGNOSIS — R49.8 HYPOPHONIA: ICD-10-CM

## 2023-02-22 DIAGNOSIS — R25.8 BRADYKINESIA: ICD-10-CM

## 2023-02-22 DIAGNOSIS — G20 PARKINSON'S DISEASE (HCC): ICD-10-CM

## 2023-02-22 DIAGNOSIS — F02.80 DEMENTIA ASSOCIATED WITH PARKINSON'S DISEASE (HCC): ICD-10-CM

## 2023-02-22 DIAGNOSIS — G20 DEMENTIA ASSOCIATED WITH PARKINSON'S DISEASE (HCC): ICD-10-CM

## 2023-02-22 PROCEDURE — 95816 EEG AWAKE AND DROWSY: CPT

## 2023-02-22 NOTE — PROGRESS NOTES
[facility] Laboratory Technician worksheet       EEG Date: 2023    Name: Tristan Cuellar   : 1955   Age: 78 y.o. SEX: male    Room: op    MRN: [id number]     CSN: [id number]    Ordering Provider: leopold,P  EEG Number: [id number] Time of Test:  4302    Hand: Right   Sedation: No    H.V. Done: No  age  Photic: Yes    Sleep: No   Drowsy: Yes   Sleep Deprived: No    Seizures observed: no    Mentality: alert, slow responses     Clinical History: Patient has a Hx of Parkinson's w/ dementia and shuffling gait. No recent falls. Having some confusion, accompanied by wife. CT of the head 10/28/2022  IMPRESSION:     Atrophy and moderate to severe periventricular white matter ischemic     change. No evidence for acute infarct     MRI of the brain- Pending   Past Medical History:       Diagnosis Date    Cavitary lesion of lung     COPD (chronic obstructive pulmonary disease) (HCC)     HTN (hypertension)     Pneumonia          Prior to Admission medications    Medication Sig Start Date End Date Taking?  Authorizing Provider   arformoterol tartrate (BROVANA) 15 MCG/2ML NEBU Inhale into the lungs 16   Historical Provider, MD   KLOR-CON M10 10 MEQ extended release tablet TAKE 1 TABLET BY MOUTH EVERY DAY FOR 90 DAYS 23   Historical Provider, MD   rotigotine (NEUPRO) 2 MG/24HR Place 1 patch onto the skin daily 23   ANUJA Prieto CNP   Multiple Vitamins-Minerals (OCUVITE PO) Take by mouth    Historical Provider, MD   carbidopa-levodopa (SINEMET)  MG per tablet Take 2 tablets by mouth 3 times daily 22   ANUJA Prieto CNP   DILT- MG extended release capsule TAKE 2 CAPSULES BY MOUTH EVERY DAY 22   Historical Provider, MD   dexamethasone (DECADRON) 4 MG tablet  11/10/22   Historical Provider, MD   cyanocobalamin 1000 MCG/ML injection  22   Historical Provider, MD   formoterol (PERFOROMIST) 20 MCG/2ML nebulizer solution  22 Historical Provider, MD   midodrine (PROAMATINE) 5 MG tablet TAKE 1 TABLET BY MOUTH 3 TIMES A DAY 9/14/22   Historical Provider, MD   DALIRESP 500 MCG tablet TAKE 1 TABLET BY MOUTH EVERY DAY FOR 90 DAYS 9/19/22   Historical Provider, MD   potassium chloride 10 MEQ/100ML Infuse 10 mEq intravenously once    Historical Provider, MD   aspirin 81 MG tablet Take 81 mg by mouth daily    Historical Provider, MD   acetaminophen (TYLENOL) 80 MG chewable tablet Take 80 mg by mouth every 4 hours as needed for Pain    Historical Provider, MD   furosemide (LASIX) 20 MG tablet Take 20 mg by mouth 2 times daily    Historical Provider, MD   budesonide (PULMICORT) 0.5 MG/2ML nebulizer suspension Take 1 ampule by nebulization 2 times daily    Historical Provider, MD   dilTIAZem (TIAZAC) 180 MG extended release capsule Take 180 mg by mouth daily    Historical Provider, MD   albuterol (PROVENTIL) (2.5 MG/3ML) 0.083% nebulizer solution Take 2.5 mg by nebulization every 6 hours as needed for Wheezing    Historical Provider, MD   albuterol sulfate HFA (PROAIR HFA) 108 (90 BASE) MCG/ACT inhaler Inhale 2 puffs into the lungs every 4 hours as needed for Wheezing or Shortness of Breath 1/24/16   ANUJA Ward - CNP       Technician: Harrison Turner 2/22/2023

## 2023-02-24 ENCOUNTER — TELEPHONE (OUTPATIENT)
Dept: NEUROLOGY | Age: 68
End: 2023-02-24

## 2023-02-24 NOTE — TELEPHONE ENCOUNTER
----- Message from ANUJA Harris - CNP sent at 2/24/2023  2:07 PM EST -----  Please let patient know his vitamin B6 level is elevated=210. Please ask him to stop vitamin B6 supplements, as elevated vitamin B6 level can be neurotoxic.   Bola Davidson, CNP

## 2023-03-03 PROBLEM — G20.A1 PARKINSON'S DISEASE: Status: ACTIVE | Noted: 2023-01-01

## 2023-03-03 PROBLEM — G20 DEMENTIA ASSOCIATED WITH PARKINSON'S DISEASE (HCC): Status: ACTIVE | Noted: 2023-03-03

## 2023-03-03 PROBLEM — F02.80 DEMENTIA ASSOCIATED WITH PARKINSON'S DISEASE (HCC): Status: ACTIVE | Noted: 2023-03-03

## 2023-03-03 PROBLEM — G20 PARKINSON'S DISEASE (HCC): Status: ACTIVE | Noted: 2023-03-03

## 2023-03-10 ENCOUNTER — OFFICE VISIT (OUTPATIENT)
Dept: NEUROLOGY | Age: 68
End: 2023-03-10
Payer: MEDICARE

## 2023-03-10 VITALS
HEIGHT: 63 IN | BODY MASS INDEX: 32.78 KG/M2 | HEART RATE: 95 BPM | SYSTOLIC BLOOD PRESSURE: 126 MMHG | WEIGHT: 185 LBS | OXYGEN SATURATION: 85 % | DIASTOLIC BLOOD PRESSURE: 62 MMHG

## 2023-03-10 DIAGNOSIS — R26.89 SHUFFLING GAIT: ICD-10-CM

## 2023-03-10 DIAGNOSIS — R25.8 BRADYKINESIA: ICD-10-CM

## 2023-03-10 DIAGNOSIS — G20 PARKINSON'S DISEASE (HCC): Primary | ICD-10-CM

## 2023-03-10 DIAGNOSIS — G20 DEMENTIA ASSOCIATED WITH PARKINSON'S DISEASE (HCC): ICD-10-CM

## 2023-03-10 DIAGNOSIS — F02.80 DEMENTIA ASSOCIATED WITH PARKINSON'S DISEASE (HCC): ICD-10-CM

## 2023-03-10 DIAGNOSIS — R49.8 HYPOPHONIA: ICD-10-CM

## 2023-03-10 PROCEDURE — G8417 CALC BMI ABV UP PARAM F/U: HCPCS | Performed by: NURSE PRACTITIONER

## 2023-03-10 PROCEDURE — G8427 DOCREV CUR MEDS BY ELIG CLIN: HCPCS | Performed by: NURSE PRACTITIONER

## 2023-03-10 PROCEDURE — 99213 OFFICE O/P EST LOW 20 MIN: CPT | Performed by: NURSE PRACTITIONER

## 2023-03-10 PROCEDURE — 3017F COLORECTAL CA SCREEN DOC REV: CPT | Performed by: NURSE PRACTITIONER

## 2023-03-10 PROCEDURE — G8484 FLU IMMUNIZE NO ADMIN: HCPCS | Performed by: NURSE PRACTITIONER

## 2023-03-10 PROCEDURE — 1036F TOBACCO NON-USER: CPT | Performed by: NURSE PRACTITIONER

## 2023-03-10 PROCEDURE — 1123F ACP DISCUSS/DSCN MKR DOCD: CPT | Performed by: NURSE PRACTITIONER

## 2023-03-10 RX ORDER — METAXALONE 800 MG/1
TABLET ORAL
COMMUNITY
Start: 2023-03-07

## 2023-03-10 RX ORDER — ETODOLAC 400 MG/1
TABLET, FILM COATED ORAL
COMMUNITY
Start: 2023-03-07

## 2023-03-10 NOTE — LETTER
March 10, 2023       Kourtney De La O YOB: 1955   [address] Date of Visit:  3/10/2023       To Whom It May Concern:    Gabe Guzmanan was seen in my clinic on 3/10/2023. If you have any questions or concerns, please don't hesitate to call.     Sincerely,        ANUJA Harris - CNP

## 2023-03-10 NOTE — PROGRESS NOTES
NEUROLOGY OUT PATIENT FOLLOW UP NOTE:  3/10/16765:35 AM    Pete Meza is here for follow up for parkinson's disease, confusion, frequent falls.         ROS:  Respiratory : no cough, no shortness of breath  Cardiac: no chest pain. No palpitations.  Renal : no flank pain, no hematuria    Skin: no rash      No Known Allergies    Current Outpatient Medications:     metaxalone (SKELAXIN) 800 MG tablet, , Disp: , Rfl:     etodolac (LODINE) 400 MG tablet, TAKE 1 TABLET BY MOUTH TWICE A DAY WITH FOOD X 5 DAYS, Disp: , Rfl:     KLOR-CON M10 10 MEQ extended release tablet, TAKE 1 TABLET BY MOUTH EVERY DAY FOR 90 DAYS, Disp: , Rfl:     rotigotine (NEUPRO) 2 MG/24HR, Place 1 patch onto the skin daily, Disp: 30 patch, Rfl: 3    Multiple Vitamins-Minerals (OCUVITE PO), Take by mouth, Disp: , Rfl:     carbidopa-levodopa (SINEMET)  MG per tablet, Take 2 tablets by mouth 3 times daily, Disp: 180 tablet, Rfl: 3    DILT- MG extended release capsule, TAKE 2 CAPSULES BY MOUTH EVERY DAY, Disp: , Rfl:     cyanocobalamin 1000 MCG/ML injection, , Disp: , Rfl:     formoterol (PERFOROMIST) 20 MCG/2ML nebulizer solution, , Disp: , Rfl:     midodrine (PROAMATINE) 5 MG tablet, TAKE 1 TABLET BY MOUTH 3 TIMES A DAY, Disp: , Rfl:     potassium chloride 10 MEQ/100ML, Infuse 10 mEq intravenously once, Disp: , Rfl:     aspirin 81 MG tablet, Take 81 mg by mouth daily, Disp: , Rfl:     acetaminophen (TYLENOL) 80 MG chewable tablet, Take 80 mg by mouth every 4 hours as needed for Pain, Disp: , Rfl:     furosemide (LASIX) 20 MG tablet, Take 20 mg by mouth 2 times daily, Disp: , Rfl:     budesonide (PULMICORT) 0.5 MG/2ML nebulizer suspension, Take 1 ampule by nebulization 2 times daily, Disp: , Rfl:     dilTIAZem (TIAZAC) 180 MG extended release capsule, Take 180 mg by mouth daily, Disp: , Rfl:     albuterol (PROVENTIL) (2.5 MG/3ML) 0.083% nebulizer solution, Take 2.5 mg by nebulization every 6 hours as needed for Wheezing, Disp: , Rfl:      albuterol sulfate HFA (PROAIR HFA) 108 (90 BASE) MCG/ACT inhaler, Inhale 2 puffs into the lungs every 4 hours as needed for Wheezing or Shortness of Breath, Disp: 1 Inhaler, Rfl: 3    arformoterol tartrate (BROVANA) 15 MCG/2ML NEBU, Inhale into the lungs (Patient not taking: Reported on 3/10/2023), Disp: , Rfl:     dexamethasone (DECADRON) 4 MG tablet, , Disp: , Rfl:     DALIRESP 500 MCG tablet, TAKE 1 TABLET BY MOUTH EVERY DAY FOR 90 DAYS (Patient not taking: Reported on 3/10/2023), Disp: , Rfl:     I reviewed the past medical history, allergies, medications, social history and family history. PE:   Vitals:    03/10/23 0827   BP: 126/62   Site: Left Upper Arm   Position: Sitting   Cuff Size: Medium Adult   Pulse: 95   SpO2: (!) 85%   Weight: 185 lb (83.9 kg)   Height: 5' 3\" (1.6 m)     General Appearance:  well developed, well nourished,he is wearing nasal cannula oxygen  Gen: NAD, Language is Intact. Skin: no rash, lesion, dry  to touch. warm  Head: no rash, no icterus  Neck: There is no carotid bruits. The Neck is supple. There is no neck lymphadenopathy. Neuro: CN 2-12 grossly intact with no focal deficits. Power 5/5 Throughout symmetric, Reflexes are  symmetric. Long tracts are intact. Cerebellar exam is Intact. Sensory exam is intact to light touch. Gait is shuffling. no resting tremor, no pinrolling, +ve  bradykinesia, +ve Hypohonia, +ve decreased blink rate, no  difficulty exiting chair, +ve decreased arm swing, +vestooped forward. Gait guarded, no shuffling  Musculoskeletal:  Has no hand arthritis, no limitation of ROM in any of the four extremities.   Lower extremities no edema          DATA:         Results for orders placed or performed in visit on 02/17/23   CBC   Result Value Ref Range    WBC 10.5 3.5 - 11.0 K/uL    RBC 5.51 4.50 - 6.10 M/uL    Hemoglobin 14.7 13.5 - 17.0 g/dL    Hematocrit 44.8 40.0 - 51.0 %    MCV 81.3 80.0 - 99.0 fL    MCH 26.7 25.0 - 33.0 pg    MCHC 32.8 31.0 - 36.0 g/dL RDW 16.7 (H) 11.5 - 15.0 %    Neutrophils % 76.7 %    Lymphocyte % 13.8 %    Monocytes 6.7 %    Eosinophils % 1.4 %    Basophils % 0.6 %    Platelets 011 890 - 589 K/uL    Absolute Neut # 8.08 (H) 1.50 - 7.50 K/uL    Absolute Lymph # 1.45 1.00 - 4.00 K/uL    Absolute Mono # 0.71 0.20 - 1.00 K/uL    Absolute Eos # 0.15 0.00 - 0.50 K/uL    Absolute Baso # 0.06 0.00 - 0.20 K/uL    MPV 9.3 9.3 - 13.0 fL   Urine with Reflexed Micro   Result Value Ref Range    Color, UA DARK YELLOW (A) YELLOW-STR    Appearance CLEAR CLEAR    Spec Grav, Fluid 1.021 1.005 - 1.03    pH 6.0 5.0 - 9.0    Protein, Urine NEGATIVE NEGATIVE    Glucose NEGATIVE NEGATIVE    Ketones, Urine NEGATIVE NEGATIVE    Bilirubin NEGATIVE NEGATIVE    Occult Blood,Urine NEGATIVE NEGATIVE    Urobilinogen, Urine NORMAL <=2.0    Nitrite, Urine NEGATIVE NEGATIVE    Leukocyte Esterase, Urine NEGATIVE NEGATIVE   Vitamin B6   Result Value Ref Range    Vitamin B6, Plasma 210 (H) 20 - 125 nmol/L   Hepatic Function Panel   Result Value Ref Range    Total Bilirubin 0.5 <=1.2 mg/dL    Bilirubin, Direct 0.2 0.0 - 0.3 mg/dL    Alk Phosphatase 102 40 - 125 U/L    AST 26 9 - 50 U/L    ALT 12 5 - 50 U/L    Total Protein 7.0 6.1 - 8.3 g/dL    Albumin 4.8 3.5 - 5.2 g/dL             Results for orders placed in visit on 23    MRI BRAIN WO CONTRAST    Narrative  Ordering Provider: [employer]    Radiology Department  Patient:  Manuel Rogel  [facility] [place]. :  1955   Sex:   Jesu point, [address] [facility]  Location:  [place] [address]   Unit #:   [id number]  Acct #:  [de-identified]  Ordering Phys: Mary Ellen Villasenor ELIEL    Exam Date: 23  Accession #:  [id number]  Exam:  MRI   MRI Brain Without Contrast  Result: See Report    EXAM:  MR HEAD WITHOUT INTRAVENOUS CONTRAST    CLINICAL INDICATION:  NEW ONSET MEMORY ISSUES, CONFUSION, HX OF  PARKINSON''S    TECHNIQUE:  Multiplanar and multisequence MR images of the brain were  obtained without intravenous contrast.  This report was created using  eSNF report generation technology. COMPARISON:  MRI brain with and without contrast 5/27/2022. CT head  without contrast 10/28/2022. FINDINGS:    BRAIN AND EXTRA-AXIAL SPACES:  Multiple T2 FLAIR hyperintensity foci in  the white matter of both cerebral hemispheres are chronic white matter  ischemic changes. No intra- or extra-axial hemorrhage. Posterior fossa  structures are unremarkable. Ventricles are appropriate for age. No  hydrocephalus. Basal cisterns are patent. No diffusion restriction to  suspect acute or subacute ischemic infarct. No midline shift and no mass  effects. SELLA:  Unremarkable. Normal sella turcica, pituitary gland, infundibular  stalk, optic chiasm and hypothalamus. AUDITORY SYSTEM:  Unremarkable. The internal auditory canals are patent. BONES/JOINTS:  Unremarkable. No discrete lytic or blastic abnormalities. SINUSES:  Unremarkable as visualized. Clear. MASTOID AIR CELLS:  Unremarkable as visualized. Clear. ORBITS:  Unremarkable as visualized. Both globes, extraocular muscles,  optic nerves and retrobulbar fat appear unremarkable. VASCULATURE:  Unremarkable as visualized. Normal flow voids in the major  intracranial circulation. IMPRESSION:    1. No MRI evidence of acute or subacute ischemic infarct or acute  intracranial abnormality. 2.  Multiple chronic white matter ischemic changes in both cerebral  hemispheres. 3.  No significant interval change when compared to 5/27/2022.     Electronically Signed:  Raheel Sol MD  2023/02/23 at 10:38 EST  Reading Location ID and State: [facility]  Tel [phone], Service support  [phone], Fax [phone]          cc: Mary Ellen Villasenor CNP; Rowena Colmenares MD      Dictated by:  Raine Braxton MD on 02/23/23 1038  Transcribed by:  Raine Braxton on 02/23/23 1038    Report Signed by:  Brian Dang MD,Ricci ARMAS on 02/23/23 1038    Results for orders placed in visit on 23    MRI BRAIN W WO CONTRAST    No results found for this or any previous visit. Results for orders placed in visit on 10/28/22    CT HEAD WO CONTRAST    Narrative  Ordering Provider: Yosvany Mendiola    Radiology Department  Patient:  Radha Villegas. :  1955   Sex: Nails point, [address] [facility] [place]  Location:  [facility] [phone]   Unit #:   [id number]  Acct #:  [de-identified]  Ordering Phys: Elle Quach PA-C    Exam Date: 10/28/22  Accession #:  [id number]  Exam:  CT   CT Head Without Contrast [id number]  Result: See Report    STUDY:  CT BRAIN WITHOUT CONTRAST  REASON FOR EXAM:   Male, 78years old. AMS, covid (+)/ c/o SOB, coughing,  and confusion x 10/24/2022. Pt is on 2L O2 via nasal cannula at home. Pt  has a hx of COPD. Pt was evaluated at [facility]. Laurie[facility]s during the onset of  symptoms in addition to a fever. The pt tested positive for COVID and was  discharged home. The pt did not receive Paxlovid. Family wants the pt  reevaluated due to worsening confusion. The pt has been talking about  working when he hasn''t worked in years. The pt also referred to his home  as a barn. Pt''s family is worried about a possible UTI; however, the pt  denies any urinary complaints. The pt is saying places he is not and  calling people by the wrong names. The pt denies having chest pain. Family  denies fever on behalf of the pt. Pt and his family have no further  complaints or comments at this time. RADIATION DOSAGE (If Supplied By Facility):  CTDIvol = ( ) mGy, DLP = (  750 ) mGycm  TECHNIQUE:   Transaxial CT imaging of the brain was performed without  administration of intravenous contrast material.  Individualized dose optimization techniques were used for this CT.  COMPARISON:   MRI of the brain 2022  ___________________________________  FINDINGS:    Normal soft tissue structures. Normal calvarium. Calcific plaquing of cavernous carotids.   Atrophy and moderate to severe periventricular white matter ischemic  changes..  Normal basal ganglia and thalami.  Normal brainstem.  Normal  cerebellum.    There is no intracranial hemorrhage.  There are no findings of an acute  ischemic infarction.    Mild mucosal thickening of ethmoid air cells bilaterally..  ___________________________________    IMPRESSION:  Atrophy and moderate to severe periventricular white matter ischemic  change.  No evidence for acute infarct    Electronically Signed:  Norman Wilkins MD  2022/10/28 at 16:10 EDT  Reading Location ID and State: [address] / [place]  Tel [phone], Service support  [phone], Fax [phone]          cc: Nessa Amaya MD; Gardy Guido PA-C      Dictated by:  Norman Wilkins MD on 10/28/22 1610  Technologist:   RT(R)(CT) Melanie Castro  Transcribed by:  Norman Wilkins on 10/28/22 1610    Report Signed by:  Norman Wilkins MD on 10/28/22 1610     EEG done 2/22/23:  IMPRESSION:  This is a normal EEG.  There was no evidence of  epileptiform activity appreciated throughout the study.  No clinical  seizures were observed.           KRISTIE BARNES MD    Assessment:     Diagnosis Orders   1. Parkinson's disease (HCC)        2. Hypophonia        3. Shuffling gait        4. 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 He did have a fall last week while trying to make the bed.  He tripped. No hallucination, no dysphagia.  He is currently on sinemet and neupro patch. He had his timing changed of the sinemet to 6am, 11am, and 3pm and this has helped.   His wife reports he is moving better and is having less confusion since starting the neupro patch. He is now able to dress himself. I shared with the patient and his wife that his MRI brain and EEG showed no acute findings. His UA showed no concerning findings. His vitamin B6 level is elevated=210. He has since stopped vitamin B6  supplements. After detailed discussion with patient and his wife we agreed on the following plan. Plan:  Continue Sinemet 25/100 mg to 2 tablets three times a day, take every 5 hours while awake, Take at 6am, 11am, and 3pm  Continue with Neupro patch 2 mg every 24 hours  Follow through with physical and speech therapy recommendations  Wear support hose. Avoid standing for prolonged periods of time  Take plenty of fluids with electrolytes   No driving for your safety and the safety of others  Stay physically active  Follow up in 2 months or sooner if needed.       Total time 26 min    Santos Ruano, APRN - CNP

## 2023-03-10 NOTE — PATIENT INSTRUCTIONS
Continue Sinemet 25/100 mg to 2 tablets three times a day, take every 5 hours while awake, Take at 6am, 11am, and 3pm  Continue with Neupro patch 2 mg every 24 hours  Follow through with physical and speech therapy recommendations  Wear support hose. Avoid standing for prolonged periods of time  Take plenty of fluids with electrolytes   No driving for your safety and the safety of others  Stay physically active  Follow up in 2 months or sooner if needed.

## 2023-03-29 ENCOUNTER — HOSPITAL ENCOUNTER (OUTPATIENT)
Dept: MRI IMAGING | Age: 68
Discharge: HOME | End: 2023-03-29

## 2023-03-29 ENCOUNTER — HOSPITAL ENCOUNTER (OUTPATIENT)
Dept: CT IMAGING | Age: 68
Discharge: HOME | End: 2023-03-29

## 2023-03-29 DIAGNOSIS — Z00.6 EXAMINATION FOR NORMAL COMPARISON OR CONTROL IN CLINICAL RESEARCH: ICD-10-CM

## 2023-04-18 DIAGNOSIS — G20 PARKINSON'S DISEASE (HCC): Primary | ICD-10-CM

## 2023-04-18 RX ORDER — ROTIGOTINE 2 MG/24H
1 PATCH, EXTENDED RELEASE TRANSDERMAL DAILY
Qty: 90 PATCH | Refills: 1 | Status: SHIPPED | OUTPATIENT
Start: 2023-04-18

## 2023-05-08 DIAGNOSIS — G20 PARKINSON'S DISEASE (HCC): Primary | ICD-10-CM

## 2023-05-22 ENCOUNTER — OFFICE VISIT (OUTPATIENT)
Dept: NEUROLOGY | Age: 68
End: 2023-05-22
Payer: MEDICARE

## 2023-05-22 VITALS
DIASTOLIC BLOOD PRESSURE: 75 MMHG | SYSTOLIC BLOOD PRESSURE: 125 MMHG | OXYGEN SATURATION: 95 % | HEART RATE: 77 BPM | BODY MASS INDEX: 33.13 KG/M2 | HEIGHT: 63 IN | WEIGHT: 187 LBS

## 2023-05-22 DIAGNOSIS — F02.80 DEMENTIA ASSOCIATED WITH PARKINSON'S DISEASE (HCC): ICD-10-CM

## 2023-05-22 DIAGNOSIS — R26.89 SHUFFLING GAIT: ICD-10-CM

## 2023-05-22 DIAGNOSIS — G20 PARKINSON'S DISEASE (HCC): Primary | ICD-10-CM

## 2023-05-22 DIAGNOSIS — R25.8 BRADYKINESIA: ICD-10-CM

## 2023-05-22 DIAGNOSIS — G20 DEMENTIA ASSOCIATED WITH PARKINSON'S DISEASE (HCC): ICD-10-CM

## 2023-05-22 DIAGNOSIS — R49.8 HYPOPHONIA: ICD-10-CM

## 2023-05-22 PROCEDURE — G8427 DOCREV CUR MEDS BY ELIG CLIN: HCPCS | Performed by: NURSE PRACTITIONER

## 2023-05-22 PROCEDURE — 3017F COLORECTAL CA SCREEN DOC REV: CPT | Performed by: NURSE PRACTITIONER

## 2023-05-22 PROCEDURE — 1123F ACP DISCUSS/DSCN MKR DOCD: CPT | Performed by: NURSE PRACTITIONER

## 2023-05-22 PROCEDURE — G8417 CALC BMI ABV UP PARAM F/U: HCPCS | Performed by: NURSE PRACTITIONER

## 2023-05-22 PROCEDURE — 99213 OFFICE O/P EST LOW 20 MIN: CPT | Performed by: NURSE PRACTITIONER

## 2023-05-22 PROCEDURE — 1036F TOBACCO NON-USER: CPT | Performed by: NURSE PRACTITIONER

## 2023-05-22 RX ORDER — ROTIGOTINE 3 MG/24H
3 PATCH, EXTENDED RELEASE TRANSDERMAL EVERY 24 HOURS
Qty: 30 PATCH | Refills: 0 | Status: SHIPPED | OUTPATIENT
Start: 2023-05-22

## 2023-05-22 NOTE — PROGRESS NOTES
10/28/22  Accession #:  [id number]  Exam:  CT   CT Head Without Contrast [id number]  Result: See Report    STUDY:  CT BRAIN WITHOUT CONTRAST  REASON FOR EXAM:   Male, 78years old. AMS, covid (+)/ c/o SOB, coughing,  and confusion x 10/24/2022. Pt is on 2L O2 via nasal cannula at home. Pt  has a hx of COPD. Pt was evaluated at [facility]. Laurie''s during the onset of  symptoms in addition to a fever. The pt tested positive for COVID and was  discharged home. The pt did not receive Paxlovid. Family wants the pt  reevaluated due to worsening confusion. The pt has been talking about  working when he hasn''t worked in years. The pt also referred to his home  as a barn. Pt''s family is worried about a possible UTI; however, the pt  denies any urinary complaints. The pt is saying places he is not and  calling people by the wrong names. The pt denies having chest pain. Family  denies fever on behalf of the pt. Pt and his family have no further  complaints or comments at this time. RADIATION DOSAGE (If Supplied By Facility):  CTDIvol = ( ) mGy, DLP = (  750 ) mGycm  TECHNIQUE:   Transaxial CT imaging of the brain was performed without  administration of intravenous contrast material.  Individualized dose optimization techniques were used for this CT.  COMPARISON:   MRI of the brain 05/27/2022  ___________________________________  FINDINGS:    Normal soft tissue structures. Normal calvarium. Calcific plaquing of cavernous carotids. Atrophy and moderate to severe periventricular white matter ischemic  changes. .  Normal basal ganglia and thalami. Normal brainstem. Normal  cerebellum. There is no intracranial hemorrhage. There are no findings of an acute  ischemic infarction. Mild mucosal thickening of ethmoid air cells bilaterally. .  ___________________________________    IMPRESSION:  Atrophy and moderate to severe periventricular white matter ischemic  change.   No evidence for acute infarct    Electronically Signed:  Amy Cardenas

## 2023-05-22 NOTE — PATIENT INSTRUCTIONS
Continue Sinemet 25/100 mg to 2 tablets three times a day, take every 5 hours while awake, Take at 6am, 11am, and 3pm  Change  Neupro patch to 3 mg every 24 hours  Follow through with physical and speech therapy recommendations  Wear support hose. Avoid standing for prolonged periods of time  Take plenty of fluids with electrolytes   No driving for your safety and the safety of others  Stay physically active  Follow up in 3 months or sooner if needed.

## 2023-06-22 DIAGNOSIS — G20 PARKINSON'S DISEASE (HCC): Primary | ICD-10-CM

## 2023-06-22 RX ORDER — ROTIGOTINE 3 MG/24H
PATCH, EXTENDED RELEASE TRANSDERMAL
Qty: 30 PATCH | Refills: 0 | OUTPATIENT
Start: 2023-06-22

## 2023-06-22 RX ORDER — ROTIGOTINE 3 MG/24H
3 PATCH, EXTENDED RELEASE TRANSDERMAL EVERY 24 HOURS
Qty: 30 PATCH | Refills: 5 | Status: SHIPPED | OUTPATIENT
Start: 2023-06-22

## 2023-08-07 DIAGNOSIS — G20 PARKINSON'S DISEASE (HCC): ICD-10-CM

## 2023-08-07 NOTE — TELEPHONE ENCOUNTER
Please approve or deny     Last Visit Date:  5/22/2023   Lito Meeks     Next Visit Date:    9/1/2023  Bryon Melendez

## 2023-09-01 ENCOUNTER — OFFICE VISIT (OUTPATIENT)
Dept: NEUROLOGY | Age: 68
End: 2023-09-01
Payer: MEDICARE

## 2023-09-01 VITALS
WEIGHT: 180 LBS | HEIGHT: 63 IN | DIASTOLIC BLOOD PRESSURE: 66 MMHG | SYSTOLIC BLOOD PRESSURE: 112 MMHG | BODY MASS INDEX: 31.89 KG/M2 | OXYGEN SATURATION: 91 % | HEART RATE: 97 BPM

## 2023-09-01 DIAGNOSIS — G20 PARKINSON'S DISEASE (HCC): Primary | ICD-10-CM

## 2023-09-01 DIAGNOSIS — G20 DEMENTIA ASSOCIATED WITH PARKINSON'S DISEASE (HCC): ICD-10-CM

## 2023-09-01 DIAGNOSIS — R26.89 SHUFFLING GAIT: ICD-10-CM

## 2023-09-01 DIAGNOSIS — R25.8 BRADYKINESIA: ICD-10-CM

## 2023-09-01 DIAGNOSIS — R49.8 HYPOPHONIA: ICD-10-CM

## 2023-09-01 DIAGNOSIS — F02.80 DEMENTIA ASSOCIATED WITH PARKINSON'S DISEASE (HCC): ICD-10-CM

## 2023-09-01 PROCEDURE — G8417 CALC BMI ABV UP PARAM F/U: HCPCS | Performed by: PSYCHIATRY & NEUROLOGY

## 2023-09-01 PROCEDURE — 99213 OFFICE O/P EST LOW 20 MIN: CPT | Performed by: PSYCHIATRY & NEUROLOGY

## 2023-09-01 PROCEDURE — G8427 DOCREV CUR MEDS BY ELIG CLIN: HCPCS | Performed by: PSYCHIATRY & NEUROLOGY

## 2023-09-01 PROCEDURE — 1036F TOBACCO NON-USER: CPT | Performed by: PSYCHIATRY & NEUROLOGY

## 2023-09-01 PROCEDURE — 1123F ACP DISCUSS/DSCN MKR DOCD: CPT | Performed by: PSYCHIATRY & NEUROLOGY

## 2023-09-01 PROCEDURE — 3017F COLORECTAL CA SCREEN DOC REV: CPT | Performed by: PSYCHIATRY & NEUROLOGY

## 2023-09-01 RX ORDER — PIMAVANSERIN TARTRATE 34 MG/1
CAPSULE ORAL
COMMUNITY
Start: 2023-08-15

## 2023-09-01 NOTE — PROGRESS NOTES
NEUROLOGY OUT PATIENT FOLLOW UP NOTE:  9/1/20233:20 PM    Jennifer Ferrari is here for follow up for  parkinson's disease, confusion, frequent falls.          No Known Allergies    Current Outpatient Medications:     NUPLAZID 34 MG CAPS capsule, , Disp: , Rfl:     carbidopa-levodopa (SINEMET)  MG per tablet, TAKE 2 TABLETS BY MOUTH 3 TIMES A DAY, Disp: 540 tablet, Rfl: 1    Rotigotine (NEUPRO) 3 MG/24HR PT24, Place 3 mg onto the skin every 24 hours, Disp: 30 patch, Rfl: 5    KLOR-CON M10 10 MEQ extended release tablet, TAKE 1 TABLET BY MOUTH EVERY DAY FOR 90 DAYS, Disp: , Rfl:     DILT- MG extended release capsule, TAKE 2 CAPSULES BY MOUTH EVERY DAY, Disp: , Rfl:     cyanocobalamin 1000 MCG/ML injection, , Disp: , Rfl:     formoterol (PERFOROMIST) 20 MCG/2ML nebulizer solution, , Disp: , Rfl:     midodrine (PROAMATINE) 5 MG tablet, TAKE 1 TABLET BY MOUTH 3 TIMES A DAY, Disp: , Rfl:     DALIRESP 500 MCG tablet, , Disp: , Rfl:     aspirin 81 MG tablet, Take 1 tablet by mouth daily, Disp: , Rfl:     acetaminophen (TYLENOL) 80 MG chewable tablet, Take 1 tablet by mouth every 4 hours as needed for Pain, Disp: , Rfl:     furosemide (LASIX) 20 MG tablet, Take 1 tablet by mouth 2 times daily, Disp: , Rfl:     budesonide (PULMICORT) 0.5 MG/2ML nebulizer suspension, Take 2 mLs by nebulization 2 times daily, Disp: , Rfl:     dilTIAZem (TIAZAC) 180 MG extended release capsule, Take 1 capsule by mouth daily, Disp: , Rfl:     albuterol (PROVENTIL) (2.5 MG/3ML) 0.083% nebulizer solution, Take 3 mLs by nebulization every 6 hours as needed for Wheezing, Disp: , Rfl:     albuterol sulfate HFA (PROAIR HFA) 108 (90 BASE) MCG/ACT inhaler, Inhale 2 puffs into the lungs every 4 hours as needed for Wheezing or Shortness of Breath, Disp: 1 Inhaler, Rfl: 3    metaxalone (SKELAXIN) 800 MG tablet, , Disp: , Rfl:     etodolac (LODINE) 400 MG tablet, TAKE 1 TABLET BY MOUTH TWICE A DAY WITH FOOD X 5 DAYS (Patient not taking: Reported on

## 2023-09-01 NOTE — PATIENT INSTRUCTIONS
Plan:  Continue Sinemet 25/100 mg 2 tablets three times a day, take every 5 hours while awake, Take at 6am, 11am, and 3pm  Neupro patch 3 mg every 24 hours  Follow through with physical and speech therapy recommendations  Wear support hose. Avoid standing for prolonged periods of time  Take plenty of fluids with electrolytes   No driving for your safety and the safety of others  Stay physically active  Follow up in 5 months or sooner if needed.

## (undated) DEVICE — STRIP SKIN CLSR W0.25XL4IN WHT SPUNBOUND FBR NYL HI ADH

## (undated) DEVICE — SURGICAL PROCEDURE PACK SEY33CPWCD] ABBOTT]

## (undated) DEVICE — BLADE SURG W8MM CUT DEBAKEY

## (undated) DEVICE — GLOVE ORANGE PI 7   MSG9070

## (undated) DEVICE — SUTURE ETHLN BV1305 BLK 10-0 5IN 2810G

## (undated) DEVICE — SHIELD EYE W O CLTH CVR PLAS VENT GRAFCO